# Patient Record
Sex: FEMALE | Race: WHITE | Employment: FULL TIME | URBAN - METROPOLITAN AREA
[De-identification: names, ages, dates, MRNs, and addresses within clinical notes are randomized per-mention and may not be internally consistent; named-entity substitution may affect disease eponyms.]

---

## 2017-06-14 DIAGNOSIS — F41.9 ANXIETY: ICD-10-CM

## 2017-06-14 RX ORDER — SERTRALINE HYDROCHLORIDE 100 MG/1
TABLET, FILM COATED ORAL
Qty: 90 TAB | Refills: 1 | Status: SHIPPED | OUTPATIENT
Start: 2017-06-14 | End: 2017-12-28 | Stop reason: SDUPTHER

## 2017-12-28 DIAGNOSIS — F41.9 ANXIETY: ICD-10-CM

## 2017-12-28 RX ORDER — SERTRALINE HYDROCHLORIDE 100 MG/1
TABLET, FILM COATED ORAL
Qty: 90 TAB | Refills: 0 | Status: SHIPPED | OUTPATIENT
Start: 2017-12-28 | End: 2018-03-30 | Stop reason: SDUPTHER

## 2017-12-28 NOTE — TELEPHONE ENCOUNTER
Called pt and informed her through voicemail and that she is now due for her f/u within the next 1-2 mos.

## 2017-12-28 NOTE — TELEPHONE ENCOUNTER
Please call patient. Last seen 11/17, due for f/u, needs to be seen in the next 1-2 months prior to next refill.

## 2018-03-30 ENCOUNTER — OFFICE VISIT (OUTPATIENT)
Dept: INTERNAL MEDICINE CLINIC | Age: 38
End: 2018-03-30

## 2018-03-30 VITALS
DIASTOLIC BLOOD PRESSURE: 64 MMHG | BODY MASS INDEX: 19.7 KG/M2 | OXYGEN SATURATION: 99 % | WEIGHT: 130 LBS | TEMPERATURE: 97.8 F | HEART RATE: 56 BPM | RESPIRATION RATE: 12 BRPM | HEIGHT: 68 IN | SYSTOLIC BLOOD PRESSURE: 102 MMHG

## 2018-03-30 DIAGNOSIS — Z13.220 SCREENING CHOLESTEROL LEVEL: ICD-10-CM

## 2018-03-30 DIAGNOSIS — F41.9 ANXIETY: ICD-10-CM

## 2018-03-30 DIAGNOSIS — Z13.1 DIABETES MELLITUS SCREENING: ICD-10-CM

## 2018-03-30 DIAGNOSIS — F41.9 ANXIETY: Primary | ICD-10-CM

## 2018-03-30 RX ORDER — SERTRALINE HYDROCHLORIDE 100 MG/1
TABLET, FILM COATED ORAL
Qty: 7 TAB | Refills: 0 | Status: SHIPPED | OUTPATIENT
Start: 2018-03-30 | End: 2018-03-30 | Stop reason: SDUPTHER

## 2018-03-30 RX ORDER — SERTRALINE HYDROCHLORIDE 50 MG/1
TABLET, FILM COATED ORAL
Qty: 135 TAB | Refills: 1 | Status: SHIPPED | OUTPATIENT
Start: 2018-03-30 | End: 2018-05-01 | Stop reason: SDUPTHER

## 2018-03-30 NOTE — PROGRESS NOTES
Shanna Loomis is a 40 y.o. female who was seen in clinic today (3/30/2018). Last seen in clinic on 11/4/16. Assessment & Plan:   Diagnoses and all orders for this visit:    1. Anxiety- stable, reviewed treatment options with her, reviewed life style changes to help improve mood, following changes were made today: will try to decrease from 100mg to 75mg and reassess. Reviewed expectations and getting her onto lowest dose of medications that work for her. -     sertraline (ZOLOFT) 50 mg tablet; TAKE 1.5 TABLET EVERY DAY    2. Diabetes mellitus screening- never had labs done previously, promises to get it done this time.  -     METABOLIC PANEL, COMPREHENSIVE    3. Screening cholesterol level- never had labs done previously, promises to get it done this time.  -     LIPID PANEL         Follow-up Disposition:  Return in about 6 months (around 9/30/2018) for Regular follow up. Subjective:   Sabrina Ribeiro was seen today for [address] Review  Patient is seen for anxiety. Since last visit: no major issues on a regular basis. Father did pass about 1 yr ago. She reports doing better then she would have expected. Ongoing anxiety symptoms include: nothing. She denies any: decreased sleep, feelings of apprehension/worry, poor concentration/attention and racing thoughts. Reports experiences the following side effects from the treatment: none. Prior to Admission medications    Medication Sig Start Date End Date Taking? Authorizing Provider   sertraline (ZOLOFT) 100 mg tablet TAKE 1 TABLET EVERY DAY 3/30/18  Yes Laisha Gallegos MD   levonorgestrel (MIRENA) 20 mcg/24 hour (5 years) IUD 1 Each by IntraUTERine route once. Yes Historical Provider          No Known Allergies        Review of Systems   Constitutional: Negative for malaise/fatigue and weight loss. Respiratory: Negative for cough and shortness of breath.     Cardiovascular: Negative for chest pain, palpitations and leg swelling. Gastrointestinal: Negative for abdominal pain, constipation, diarrhea, heartburn, nausea and vomiting. Genitourinary: Negative for frequency. Musculoskeletal: Negative for joint pain and myalgias. Skin: Negative for rash. Neurological: Negative for tingling, sensory change, focal weakness and headaches. Objective:   Physical Exam   Constitutional: No distress. Eyes: Conjunctivae are normal. No scleral icterus. Cardiovascular: Regular rhythm and normal heart sounds. Bradycardia present. No murmur heard. Pulmonary/Chest: Effort normal and breath sounds normal. She has no wheezes. She has no rales. Abdominal: Bowel sounds are normal. She exhibits no mass. There is no hepatosplenomegaly. There is no tenderness. Psychiatric: She has a normal mood and affect. Her behavior is normal.   Tearing up appropriately at times. Well groomed. Good eye contacts         Visit Vitals    /64    Pulse (!) 56    Temp 97.8 °F (36.6 °C) (Oral)    Resp 12    Ht 5' 8\" (1.727 m)    Wt 130 lb (59 kg)    SpO2 99%    BMI 19.77 kg/m2         Disclaimer:  Advised her to call back or return to office if symptoms worsen/change/persist.  Discussed expected course/resolution/complications of diagnosis in detail with patient. Medication risks/benefits/costs/interactions/alternatives discussed with patient. She was given an after visit summary which includes diagnoses, current medications, & vitals. She expressed understanding with the diagnosis and plan. Aspects of this note may have been generated using voice recognition software. Despite editing, there may be some syntax errors.        Shana Alegria MD

## 2018-03-30 NOTE — PATIENT INSTRUCTIONS
Anxiety Disorder: Care Instructions  Your Care Instructions    Anxiety is a normal reaction to stress. Difficult situations can cause you to have symptoms such as sweaty palms and a nervous feeling. In an anxiety disorder, the symptoms are far more severe. Constant worry, muscle tension, trouble sleeping, nausea and diarrhea, and other symptoms can make normal daily activities difficult or impossible. These symptoms may occur for no reason, and they can affect your work, school, or social life. Medicines, counseling, and self-care can all help. Follow-up care is a key part of your treatment and safety. Be sure to make and go to all appointments, and call your doctor if you are having problems. It's also a good idea to know your test results and keep a list of the medicines you take. How can you care for yourself at home? · Take medicines exactly as directed. Call your doctor if you think you are having a problem with your medicine. · Go to your counseling sessions and follow-up appointments. · Recognize and accept your anxiety. Then, when you are in a situation that makes you anxious, say to yourself, \"This is not an emergency. I feel uncomfortable, but I am not in danger. I can keep going even if I feel anxious. \"  · Be kind to your body:  ¨ Relieve tension with exercise or a massage. ¨ Get enough rest.  ¨ Avoid alcohol, caffeine, nicotine, and illegal drugs. They can increase your anxiety level and cause sleep problems. ¨ Learn and do relaxation techniques. See below for more about these techniques. · Engage your mind. Get out and do something you enjoy. Go to a funny movie, or take a walk or hike. Plan your day. Having too much or too little to do can make you anxious. · Keep a record of your symptoms. Discuss your fears with a good friend or family member, or join a support group for people with similar problems. Talking to others sometimes relieves stress.   · Get involved in social groups, or volunteer to help others. Being alone sometimes makes things seem worse than they are. · Get at least 30 minutes of exercise on most days of the week to relieve stress. Walking is a good choice. You also may want to do other activities, such as running, swimming, cycling, or playing tennis or team sports. Relaxation techniques  Do relaxation exercises 10 to 20 minutes a day. You can play soothing, relaxing music while you do them, if you wish. · Tell others in your house that you are going to do your relaxation exercises. Ask them not to disturb you. · Find a comfortable place, away from all distractions and noise. · Lie down on your back, or sit with your back straight. · Focus on your breathing. Make it slow and steady. · Breathe in through your nose. Breathe out through either your nose or mouth. · Breathe deeply, filling up the area between your navel and your rib cage. Breathe so that your belly goes up and down. · Do not hold your breath. · Breathe like this for 5 to 10 minutes. Notice the feeling of calmness throughout your whole body. As you continue to breathe slowly and deeply, relax by doing the following for another 5 to 10 minutes:  · Tighten and relax each muscle group in your body. You can begin at your toes and work your way up to your head. · Imagine your muscle groups relaxing and becoming heavy. · Empty your mind of all thoughts. · Let yourself relax more and more deeply. · Become aware of the state of calmness that surrounds you. · When your relaxation time is over, you can bring yourself back to alertness by moving your fingers and toes and then your hands and feet and then stretching and moving your entire body. Sometimes people fall asleep during relaxation, but they usually wake up shortly afterward. · Always give yourself time to return to full alertness before you drive a car or do anything that might cause an accident if you are not fully alert.  Never play a relaxation tape while you drive a car. When should you call for help? Call 911 anytime you think you may need emergency care. For example, call if:  ? · You feel you cannot stop from hurting yourself or someone else. ? Keep the numbers for these national suicide hotlines: 2-800-014-TALK (9-321.186.7330) and 0-650-KGZPLPV (8-788.998.4450). If you or someone you know talks about suicide or feeling hopeless, get help right away. ? Watch closely for changes in your health, and be sure to contact your doctor if:  ? · You have anxiety or fear that affects your life. ? · You have symptoms of anxiety that are new or different from those you had before. Where can you learn more? Go to http://kameron-enrico.info/. Enter P754 in the search box to learn more about \"Anxiety Disorder: Care Instructions. \"  Current as of: May 12, 2017  Content Version: 11.4  © 0593-2777 [employer]. Care instructions adapted under license by [employer] (which disclaims liability or warranty for this information). If you have questions about a medical condition or this instruction, always ask your healthcare professional. [address] any warranty or liability for your use of this information.

## 2018-05-01 DIAGNOSIS — F41.9 ANXIETY: ICD-10-CM

## 2018-05-01 RX ORDER — SERTRALINE HYDROCHLORIDE 100 MG/1
TABLET, FILM COATED ORAL
Qty: 90 TAB | Refills: 1 | Status: SHIPPED | OUTPATIENT
Start: 2018-05-01 | End: 2018-08-08 | Stop reason: SDUPTHER

## 2018-08-04 ENCOUNTER — APPOINTMENT (OUTPATIENT)
Dept: GENERAL RADIOLOGY | Age: 38
End: 2018-08-04
Attending: PHYSICIAN ASSISTANT
Payer: COMMERCIAL

## 2018-08-04 ENCOUNTER — HOSPITAL ENCOUNTER (EMERGENCY)
Age: 38
Discharge: HOME | End: 2018-08-04
Attending: EMERGENCY MEDICINE | Admitting: EMERGENCY MEDICINE
Payer: COMMERCIAL

## 2018-08-04 VITALS
HEIGHT: 68 IN | RESPIRATION RATE: 18 BRPM | HEART RATE: 64 BPM | SYSTOLIC BLOOD PRESSURE: 132 MMHG | WEIGHT: 130 LBS | TEMPERATURE: 97.8 F | DIASTOLIC BLOOD PRESSURE: 82 MMHG | BODY MASS INDEX: 19.7 KG/M2 | OXYGEN SATURATION: 100 %
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
## 2018-08-04 DIAGNOSIS — S42.022A CLOSED DISPLACED FRACTURE OF SHAFT OF LEFT CLAVICLE, INITIAL ENCOUNTER: ICD-10-CM

## 2018-08-04 DIAGNOSIS — T07.XXXA MULTIPLE ABRASIONS: ICD-10-CM

## 2018-08-04 PROCEDURE — L3650 SO 8 ABD RESTRAINT PRE OTS: HCPCS

## 2018-08-04 PROCEDURE — 74011250637 HC RX REV CODE- 250/637: Performed by: PHYSICIAN ASSISTANT

## 2018-08-04 PROCEDURE — 99283 EMERGENCY DEPT VISIT LOW MDM: CPT

## 2018-08-04 PROCEDURE — 90471 IMMUNIZATION ADMIN: CPT

## 2018-08-04 PROCEDURE — 74011250636 HC RX REV CODE- 250/636: Performed by: EMERGENCY MEDICINE

## 2018-08-04 PROCEDURE — 73000 X-RAY EXAM OF COLLAR BONE: CPT

## 2018-08-04 PROCEDURE — 90715 TDAP VACCINE 7 YRS/> IM: CPT | Performed by: EMERGENCY MEDICINE

## 2018-08-04 PROCEDURE — A4565 SLINGS: HCPCS

## 2018-08-04 PROCEDURE — 71045 X-RAY EXAM CHEST 1 VIEW: CPT

## 2018-08-04 PROCEDURE — 74011250637 HC RX REV CODE- 250/637: Performed by: EMERGENCY MEDICINE

## 2018-08-04 RX ORDER — IBUPROFEN 600 MG/1
600 TABLET ORAL
Qty: 20 TAB | Refills: 0 | Status: SHIPPED | OUTPATIENT
Start: 2018-08-04 | End: 2018-08-27

## 2018-08-04 RX ORDER — HYDROCODONE BITARTRATE AND ACETAMINOPHEN 7.5; 325 MG/1; MG/1
1 TABLET ORAL
Qty: 20 TAB | Refills: 0 | Status: SHIPPED | OUTPATIENT
Start: 2018-08-04 | End: 2018-08-27

## 2018-08-04 RX ORDER — ACETAMINOPHEN 325 MG/1
650 TABLET ORAL
Status: COMPLETED | OUTPATIENT
Start: 2018-08-04 | End: 2018-08-04

## 2018-08-04 RX ORDER — ONDANSETRON 4 MG/1
4 TABLET, ORALLY DISINTEGRATING ORAL
Status: COMPLETED | OUTPATIENT
Start: 2018-08-04 | End: 2018-08-04

## 2018-08-04 RX ORDER — METHOCARBAMOL 500 MG/1
750 TABLET, FILM COATED ORAL
Status: COMPLETED | OUTPATIENT
Start: 2018-08-04 | End: 2018-08-04

## 2018-08-04 RX ORDER — METHOCARBAMOL 750 MG/1
750 TABLET, FILM COATED ORAL 3 TIMES DAILY
Qty: 20 TAB | Refills: 0 | Status: SHIPPED | OUTPATIENT
Start: 2018-08-04 | End: 2018-08-27

## 2018-08-04 RX ORDER — OXYCODONE HYDROCHLORIDE 5 MG/1
5 TABLET ORAL
Status: COMPLETED | OUTPATIENT
Start: 2018-08-04 | End: 2018-08-04

## 2018-08-04 RX ADMIN — METHOCARBAMOL 750 MG: 500 TABLET ORAL at 15:19

## 2018-08-04 RX ADMIN — OXYCODONE HYDROCHLORIDE 5 MG: 5 TABLET ORAL at 15:19

## 2018-08-04 RX ADMIN — ACETAMINOPHEN 650 MG: 325 TABLET, FILM COATED ORAL at 14:45

## 2018-08-04 RX ADMIN — TETANUS TOXOID, REDUCED DIPHTHERIA TOXOID AND ACELLULAR PERTUSSIS VACCINE, ADSORBED 0.5 ML: 5; 2.5; 8; 8; 2.5 SUSPENSION INTRAMUSCULAR at 15:20

## 2018-08-04 RX ADMIN — ONDANSETRON 4 MG: 4 TABLET, ORALLY DISINTEGRATING ORAL at 15:19

## 2018-08-04 NOTE — DISCHARGE INSTRUCTIONS
Broken Collarbone: Care Instructions  Your Care Instructions    You have broken or cracked your collarbone, or clavicle. The collarbone is the long, slightly curved bone that connects the shoulder to the chest. It supports the shoulder. A broken collarbone may take 6 weeks or longer to heal. You will need to wear an arm sling to keep the broken bone from moving while it heals. At first, it may hurt to move your arm. This will get better with time. You heal best when you take good care of yourself. Eat a variety of healthy foods, and don't smoke. Follow-up care is a key part of your treatment and safety. Be sure to make and go to all appointments, and call your doctor if you are having problems. It's also a good idea to know your test results and keep a list of the medicines you take. How can you care for yourself at home? · Wear the sling day and night for as long as your doctor tells you to. You may take off the sling when you bathe. When the sling is off, avoid arm positions or motions that cause or increase pain. · Put ice or a cold pack on your collarbone for 10 to 20 minutes at a time. Try to do this every 1 to 2 hours for the next 3 days (when you are awake) or until the swelling goes down. Put a thin cloth between the ice and your skin. · Be safe with medicines. Take pain medicines exactly as directed. ¨ If the doctor gave you a prescription medicine for pain, take it as prescribed. ¨ If you are not taking a prescription pain medicine, ask your doctor if you can take an over-the-counter medicine. ¨ Do not take two or more pain medicines at the same time unless the doctor told you to. Many pain medicines have acetaminophen, which is Tylenol. Too much acetaminophen (Tylenol) can be harmful. · Try sleeping with pillows propped under your arm for comfort. · After a few days, put your fingers, wrist, and elbow through their full range of motion several times a day.  This will keep them from getting stiff. You may get instructions on rehabilitation exercises you can do when your shoulder starts to heal.  · You may use warm packs after the first 3 days for 15 to 20 minutes at a time to ease pain. · You may notice a bump where the collarbone is broken. Over time, the bump will get smaller. A small bump may remain, but it should not affect your arm's strength or movement. When should you call for help? Call your doctor now or seek immediate medical care if:    · Your fingers become numb, tingly, cool, or pale.     · You cannot move your arm.    Watch closely for changes in your health, and be sure to contact your doctor if:    · You have new or increased pain.     · You have new or increased swelling.     · You do not get better as expected. Where can you learn more? Go to http://kameron-enrico.info/. Enter P186 in the search box to learn more about \"Broken Collarbone: Care Instructions. \"  Current as of: November 29, 2017  Content Version: 11.7  © 1939-9457 [employer]. Care instructions adapted under license by [employer] (which disclaims liability or warranty for this information). If you have questions about a medical condition or this instruction, always ask your healthcare professional. [place] [address] any warranty or liability for your use of this information. Scrapes (Abrasions): Care Instructions  Your Care Instructions  Scrapes (abrasions) are wounds where your skin has been rubbed or torn off. Most scrapes do not go deep into the skin, but some may remove several layers of skin. Scrapes usually don't bleed much, but they may ooze pinkish fluid. Scrapes on the head or face may appear worse than they are. They may bleed a lot because of the good blood supply to this area. Most scrapes heal well and may not need a bandage. They usually heal within 3 to 7 days.  A large, deep scrape may take 1 to 2 weeks or longer to heal. A scab may form on some scrapes. Follow-up care is a key part of your treatment and safety. Be sure to make and go to all appointments, and call your doctor if you are having problems. It's also a good idea to know your test results and keep a list of the medicines you take. How can you care for yourself at home? · If your doctor told you how to care for your wound, follow your doctor's instructions. If you did not get instructions, follow this general advice:  ¨ Wash the scrape with clean water 2 times a day. Don't use hydrogen peroxide or alcohol, which can slow healing. ¨ You may cover the scrape with a thin layer of petroleum jelly, such as Vaseline, and a nonstick bandage. ¨ Apply more petroleum jelly and replace the bandage as needed. · Prop up the injured area on a pillow anytime you sit or lie down during the next 3 days. Try to keep it above the level of your heart. This will help reduce swelling. · Be safe with medicines. Take pain medicines exactly as directed. ¨ If the doctor gave you a prescription medicine for pain, take it as prescribed. ¨ If you are not taking a prescription pain medicine, ask your doctor if you can take an over-the-counter medicine. When should you call for help? Call your doctor now or seek immediate medical care if:    · You have signs of infection, such as:  ¨ Increased pain, swelling, warmth, or redness around the scrape. ¨ Red streaks leading from the scrape. ¨ Pus draining from the scrape. ¨ A fever.     · The scrape starts to bleed, and blood soaks through the bandage. Oozing small amounts of blood is normal.    Watch closely for changes in your health, and be sure to contact your doctor if the scrape is not getting better each day. Where can you learn more? Go to http://kameron-enrico.info/. Enter A374 in the search box to learn more about \"Scrapes (Abrasions): Care Instructions. \"  Current as of: November 20, 2017  Content Version: 11.7  © 0125-7444 [employer]. Care instructions adapted under license by [employer] (which disclaims liability or warranty for this information). If you have questions about a medical condition or this instruction, always ask your healthcare professional. [address] any warranty or liability for your use of this information.

## 2018-08-04 NOTE — ED NOTES
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
## 2018-08-04 NOTE — ED NOTES
Patient verbalizes understanding of discharge instructions. Opportunity for questions provided. Patient in no apparent distress. Patient ambulatory upon discharge. Visit Vitals  /82 (BP 1 Location: Right arm, BP Patient Position: Sitting)  Pulse 64  Temp 97.8 °F (36.6 °C)  Resp 18  Ht 5' 8\" (1.727 m)  Wt 59 kg (130 lb)  SpO2 100%  BMI 19.77 kg/m2 Patient requested copy of xray. Tech will bring it to ER.
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
## 2018-08-08 ENCOUNTER — ANESTHESIA EVENT (OUTPATIENT)
Dept: MEDSURG UNIT | Age: 38
End: 2018-08-08
Payer: COMMERCIAL

## 2018-08-08 DIAGNOSIS — F41.9 ANXIETY: ICD-10-CM

## 2018-08-08 RX ORDER — SERTRALINE HYDROCHLORIDE 100 MG/1
TABLET, FILM COATED ORAL
Qty: 90 TAB | Refills: 0 | Status: SHIPPED | OUTPATIENT
Start: 2018-08-08 | End: 2018-08-10 | Stop reason: SDUPTHER
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
## 2018-08-09 ENCOUNTER — HOSPITAL ENCOUNTER (OUTPATIENT)
Age: 38
Setting detail: OUTPATIENT SURGERY
Discharge: HOME | End: 2018-08-09
Attending: ORTHOPAEDIC SURGERY | Admitting: ORTHOPAEDIC SURGERY
Payer: COMMERCIAL

## 2018-08-09 ENCOUNTER — ANESTHESIA (OUTPATIENT)
Dept: MEDSURG UNIT | Age: 38
End: 2018-08-09
Payer: COMMERCIAL

## 2018-08-09 VITALS
DIASTOLIC BLOOD PRESSURE: 61 MMHG | BODY MASS INDEX: 19.7 KG/M2 | HEART RATE: 58 BPM | SYSTOLIC BLOOD PRESSURE: 108 MMHG | TEMPERATURE: 98.1 F | HEIGHT: 68 IN | WEIGHT: 130 LBS | OXYGEN SATURATION: 98 % | RESPIRATION RATE: 13 BRPM

## 2018-08-09 LAB — HCG UR QL: NEGATIVE

## 2018-08-09 PROCEDURE — 74011250636 HC RX REV CODE- 250/636: Performed by: ANESTHESIOLOGY

## 2018-08-09 PROCEDURE — 76030000004 HC AMB SURG OR TIME 2 TO 2.5: Performed by: ORTHOPAEDIC SURGERY

## 2018-08-09 PROCEDURE — 77030011640 HC PAD GRND REM COVD -A: Performed by: ORTHOPAEDIC SURGERY

## 2018-08-09 PROCEDURE — 74011250636 HC RX REV CODE- 250/636

## 2018-08-09 PROCEDURE — 77030003737 HC BIT DRL ACMD -C: Performed by: ORTHOPAEDIC SURGERY

## 2018-08-09 PROCEDURE — 76210000036 HC AMBSU PH I REC 1.5 TO 2 HR: Performed by: ORTHOPAEDIC SURGERY

## 2018-08-09 PROCEDURE — 77030003601 HC NDL NRV BLK BBMI -A

## 2018-08-09 PROCEDURE — C1713 ANCHOR/SCREW BN/BN,TIS/BN: HCPCS | Performed by: ORTHOPAEDIC SURGERY

## 2018-08-09 PROCEDURE — 77030032490 HC SLV COMPR SCD KNE COVD -B: Performed by: ORTHOPAEDIC SURGERY

## 2018-08-09 PROCEDURE — 77030031139 HC SUT VCRL2 J&J -A: Performed by: ORTHOPAEDIC SURGERY

## 2018-08-09 PROCEDURE — 74011000250 HC RX REV CODE- 250

## 2018-08-09 PROCEDURE — 76060000064 HC AMB SURG ANES 2 TO 2.5 HR: Performed by: ORTHOPAEDIC SURGERY

## 2018-08-09 PROCEDURE — 74011250636 HC RX REV CODE- 250/636: Performed by: PHYSICIAN ASSISTANT

## 2018-08-09 PROCEDURE — 77030018836 HC SOL IRR NACL ICUM -A: Performed by: ORTHOPAEDIC SURGERY

## 2018-08-09 PROCEDURE — 77030020782 HC GWN BAIR PAWS FLX 3M -B

## 2018-08-09 PROCEDURE — 77030026438 HC STYL ET INTUB CARD -A: Performed by: ANESTHESIOLOGY

## 2018-08-09 PROCEDURE — A4565 SLINGS: HCPCS

## 2018-08-09 PROCEDURE — 77030018673: Performed by: ORTHOPAEDIC SURGERY

## 2018-08-09 PROCEDURE — 81025 URINE PREGNANCY TEST: CPT

## 2018-08-09 PROCEDURE — 77030008684 HC TU ET CUF COVD -B: Performed by: ANESTHESIOLOGY

## 2018-08-09 RX ORDER — SODIUM CHLORIDE 0.9 % (FLUSH) 0.9 %
5-10 SYRINGE (ML) INJECTION AS NEEDED
Status: DISCONTINUED | OUTPATIENT
Start: 2018-08-09 | End: 2018-08-09 | Stop reason: HOSPADM

## 2018-08-09 RX ORDER — DEXMEDETOMIDINE HYDROCHLORIDE 4 UG/ML
INJECTION, SOLUTION INTRAVENOUS AS NEEDED
Status: DISCONTINUED | OUTPATIENT
Start: 2018-08-09 | End: 2018-08-09 | Stop reason: HOSPADM

## 2018-08-09 RX ORDER — ONDANSETRON 2 MG/ML
4 INJECTION INTRAMUSCULAR; INTRAVENOUS AS NEEDED
Status: DISCONTINUED | OUTPATIENT
Start: 2018-08-09 | End: 2018-08-09 | Stop reason: HOSPADM

## 2018-08-09 RX ORDER — LIDOCAINE HYDROCHLORIDE 10 MG/ML
0.1 INJECTION, SOLUTION EPIDURAL; INFILTRATION; INTRACAUDAL; PERINEURAL AS NEEDED
Status: DISCONTINUED | OUTPATIENT
Start: 2018-08-09 | End: 2018-08-09 | Stop reason: HOSPADM

## 2018-08-09 RX ORDER — MIDAZOLAM HYDROCHLORIDE 1 MG/ML
0.5 INJECTION, SOLUTION INTRAMUSCULAR; INTRAVENOUS
Status: DISCONTINUED | OUTPATIENT
Start: 2018-08-09 | End: 2018-08-09 | Stop reason: HOSPADM

## 2018-08-09 RX ORDER — FENTANYL CITRATE 50 UG/ML
25 INJECTION, SOLUTION INTRAMUSCULAR; INTRAVENOUS
Status: DISCONTINUED | OUTPATIENT
Start: 2018-08-09 | End: 2018-08-09 | Stop reason: HOSPADM

## 2018-08-09 RX ORDER — SODIUM CHLORIDE 0.9 % (FLUSH) 0.9 %
5-10 SYRINGE (ML) INJECTION EVERY 8 HOURS
Status: DISCONTINUED | OUTPATIENT
Start: 2018-08-09 | End: 2018-08-09 | Stop reason: HOSPADM

## 2018-08-09 RX ORDER — FENTANYL CITRATE 50 UG/ML
INJECTION, SOLUTION INTRAMUSCULAR; INTRAVENOUS AS NEEDED
Status: DISCONTINUED | OUTPATIENT
Start: 2018-08-09 | End: 2018-08-09 | Stop reason: HOSPADM

## 2018-08-09 RX ORDER — SODIUM CHLORIDE, SODIUM LACTATE, POTASSIUM CHLORIDE, CALCIUM CHLORIDE 600; 310; 30; 20 MG/100ML; MG/100ML; MG/100ML; MG/100ML
125 INJECTION, SOLUTION INTRAVENOUS CONTINUOUS
Status: DISCONTINUED | OUTPATIENT
Start: 2018-08-09 | End: 2018-08-09 | Stop reason: HOSPADM

## 2018-08-09 RX ORDER — DIPHENHYDRAMINE HYDROCHLORIDE 50 MG/ML
12.5 INJECTION, SOLUTION INTRAMUSCULAR; INTRAVENOUS AS NEEDED
Status: DISCONTINUED | OUTPATIENT
Start: 2018-08-09 | End: 2018-08-09 | Stop reason: HOSPADM

## 2018-08-09 RX ORDER — PHENYLEPHRINE HCL IN 0.9% NACL 0.4MG/10ML
SYRINGE (ML) INTRAVENOUS AS NEEDED
Status: DISCONTINUED | OUTPATIENT
Start: 2018-08-09 | End: 2018-08-09 | Stop reason: HOSPADM

## 2018-08-09 RX ORDER — MIDAZOLAM HYDROCHLORIDE 1 MG/ML
1 INJECTION, SOLUTION INTRAMUSCULAR; INTRAVENOUS AS NEEDED
Status: DISCONTINUED | OUTPATIENT
Start: 2018-08-09 | End: 2018-08-09 | Stop reason: HOSPADM

## 2018-08-09 RX ORDER — OXYCODONE AND ACETAMINOPHEN 5; 325 MG/1; MG/1
1 TABLET ORAL AS NEEDED
Status: DISCONTINUED | OUTPATIENT
Start: 2018-08-09 | End: 2018-08-09 | Stop reason: HOSPADM

## 2018-08-09 RX ORDER — ROPIVACAINE HYDROCHLORIDE 5 MG/ML
100 INJECTION, SOLUTION EPIDURAL; INFILTRATION; PERINEURAL
Status: COMPLETED | OUTPATIENT
Start: 2018-08-09 | End: 2018-08-09

## 2018-08-09 RX ORDER — SODIUM CHLORIDE 9 MG/ML
25 INJECTION, SOLUTION INTRAVENOUS CONTINUOUS
Status: DISCONTINUED | OUTPATIENT
Start: 2018-08-09 | End: 2018-08-09 | Stop reason: HOSPADM

## 2018-08-09 RX ORDER — MORPHINE SULFATE 10 MG/ML
2 INJECTION, SOLUTION INTRAMUSCULAR; INTRAVENOUS
Status: DISCONTINUED | OUTPATIENT
Start: 2018-08-09 | End: 2018-08-09 | Stop reason: HOSPADM

## 2018-08-09 RX ORDER — ONDANSETRON 2 MG/ML
INJECTION INTRAMUSCULAR; INTRAVENOUS AS NEEDED
Status: DISCONTINUED | OUTPATIENT
Start: 2018-08-09 | End: 2018-08-09 | Stop reason: HOSPADM

## 2018-08-09 RX ORDER — GLYCOPYRROLATE 0.2 MG/ML
INJECTION INTRAMUSCULAR; INTRAVENOUS AS NEEDED
Status: DISCONTINUED | OUTPATIENT
Start: 2018-08-09 | End: 2018-08-09 | Stop reason: HOSPADM

## 2018-08-09 RX ORDER — NEOSTIGMINE METHYLSULFATE 1 MG/ML
INJECTION INTRAVENOUS AS NEEDED
Status: DISCONTINUED | OUTPATIENT
Start: 2018-08-09 | End: 2018-08-09 | Stop reason: HOSPADM

## 2018-08-09 RX ORDER — ROCURONIUM BROMIDE 10 MG/ML
INJECTION, SOLUTION INTRAVENOUS AS NEEDED
Status: DISCONTINUED | OUTPATIENT
Start: 2018-08-09 | End: 2018-08-09 | Stop reason: HOSPADM

## 2018-08-09 RX ORDER — FENTANYL CITRATE 50 UG/ML
50 INJECTION, SOLUTION INTRAMUSCULAR; INTRAVENOUS AS NEEDED
Status: DISCONTINUED | OUTPATIENT
Start: 2018-08-09 | End: 2018-08-09 | Stop reason: HOSPADM

## 2018-08-09 RX ORDER — DEXAMETHASONE SODIUM PHOSPHATE 4 MG/ML
INJECTION, SOLUTION INTRA-ARTICULAR; INTRALESIONAL; INTRAMUSCULAR; INTRAVENOUS; SOFT TISSUE AS NEEDED
Status: DISCONTINUED | OUTPATIENT
Start: 2018-08-09 | End: 2018-08-09 | Stop reason: HOSPADM

## 2018-08-09 RX ORDER — CEFAZOLIN SODIUM 2 G/50ML
2 SOLUTION INTRAVENOUS ONCE
Status: COMPLETED | OUTPATIENT
Start: 2018-08-09 | End: 2018-08-09

## 2018-08-09 RX ORDER — PROPOFOL 10 MG/ML
INJECTION, EMULSION INTRAVENOUS AS NEEDED
Status: DISCONTINUED | OUTPATIENT
Start: 2018-08-09 | End: 2018-08-09 | Stop reason: HOSPADM

## 2018-08-09 RX ORDER — EPHEDRINE SULFATE 50 MG/ML
INJECTION, SOLUTION INTRAVENOUS AS NEEDED
Status: DISCONTINUED | OUTPATIENT
Start: 2018-08-09 | End: 2018-08-09 | Stop reason: HOSPADM

## 2018-08-09 RX ORDER — LIDOCAINE HYDROCHLORIDE 20 MG/ML
INJECTION, SOLUTION EPIDURAL; INFILTRATION; INTRACAUDAL; PERINEURAL AS NEEDED
Status: DISCONTINUED | OUTPATIENT
Start: 2018-08-09 | End: 2018-08-09 | Stop reason: HOSPADM

## 2018-08-09 RX ADMIN — ROCURONIUM BROMIDE 5 MG: 10 INJECTION, SOLUTION INTRAVENOUS at 07:45

## 2018-08-09 RX ADMIN — DEXMEDETOMIDINE HYDROCHLORIDE 4 MCG: 4 INJECTION, SOLUTION INTRAVENOUS at 09:02

## 2018-08-09 RX ADMIN — ROCURONIUM BROMIDE 25 MG: 10 INJECTION, SOLUTION INTRAVENOUS at 07:46

## 2018-08-09 RX ADMIN — SODIUM CHLORIDE, SODIUM LACTATE, POTASSIUM CHLORIDE, AND CALCIUM CHLORIDE 125 ML/HR: 600; 310; 30; 20 INJECTION, SOLUTION INTRAVENOUS at 06:55

## 2018-08-09 RX ADMIN — FENTANYL CITRATE 25 MCG: 50 INJECTION, SOLUTION INTRAMUSCULAR; INTRAVENOUS at 10:33

## 2018-08-09 RX ADMIN — PROPOFOL 50 MG: 10 INJECTION, EMULSION INTRAVENOUS at 07:46

## 2018-08-09 RX ADMIN — ROPIVACAINE HYDROCHLORIDE 100 MG: 5 INJECTION, SOLUTION EPIDURAL; INFILTRATION; PERINEURAL at 07:36

## 2018-08-09 RX ADMIN — DEXMEDETOMIDINE HYDROCHLORIDE 4 MCG: 4 INJECTION, SOLUTION INTRAVENOUS at 09:34

## 2018-08-09 RX ADMIN — CEFAZOLIN SODIUM 2 G: 2 SOLUTION INTRAVENOUS at 08:10

## 2018-08-09 RX ADMIN — DEXAMETHASONE SODIUM PHOSPHATE 8 MG: 4 INJECTION, SOLUTION INTRA-ARTICULAR; INTRALESIONAL; INTRAMUSCULAR; INTRAVENOUS; SOFT TISSUE at 08:26

## 2018-08-09 RX ADMIN — ONDANSETRON 4 MG: 2 INJECTION INTRAMUSCULAR; INTRAVENOUS at 08:36

## 2018-08-09 RX ADMIN — EPHEDRINE SULFATE 5 MG: 50 INJECTION, SOLUTION INTRAVENOUS at 08:08

## 2018-08-09 RX ADMIN — LIDOCAINE HYDROCHLORIDE 50 MG: 20 INJECTION, SOLUTION EPIDURAL; INFILTRATION; INTRACAUDAL; PERINEURAL at 09:03

## 2018-08-09 RX ADMIN — LIDOCAINE HYDROCHLORIDE 50 MG: 20 INJECTION, SOLUTION EPIDURAL; INFILTRATION; INTRACAUDAL; PERINEURAL at 07:45

## 2018-08-09 RX ADMIN — MIDAZOLAM HYDROCHLORIDE 4 MG: 1 INJECTION, SOLUTION INTRAMUSCULAR; INTRAVENOUS at 07:32

## 2018-08-09 RX ADMIN — GLYCOPYRROLATE 0.2 MG: 0.2 INJECTION INTRAMUSCULAR; INTRAVENOUS at 09:24

## 2018-08-09 RX ADMIN — PROPOFOL 100 MG: 10 INJECTION, EMULSION INTRAVENOUS at 07:45

## 2018-08-09 RX ADMIN — FENTANYL CITRATE 50 MCG: 50 INJECTION, SOLUTION INTRAMUSCULAR; INTRAVENOUS at 08:28

## 2018-08-09 RX ADMIN — ONDANSETRON 4 MG: 2 INJECTION INTRAMUSCULAR; INTRAVENOUS at 09:24

## 2018-08-09 RX ADMIN — DEXMEDETOMIDINE HYDROCHLORIDE 8 MCG: 4 INJECTION, SOLUTION INTRAVENOUS at 08:13

## 2018-08-09 RX ADMIN — NEOSTIGMINE METHYLSULFATE 2 MG: 1 INJECTION INTRAVENOUS at 09:27

## 2018-08-09 RX ADMIN — FENTANYL CITRATE 50 MCG: 50 INJECTION, SOLUTION INTRAMUSCULAR; INTRAVENOUS at 07:32

## 2018-08-09 RX ADMIN — DEXMEDETOMIDINE HYDROCHLORIDE 4 MCG: 4 INJECTION, SOLUTION INTRAVENOUS at 09:30

## 2018-08-09 RX ADMIN — SODIUM CHLORIDE, SODIUM LACTATE, POTASSIUM CHLORIDE, AND CALCIUM CHLORIDE: 600; 310; 30; 20 INJECTION, SOLUTION INTRAVENOUS at 09:40

## 2018-08-09 RX ADMIN — Medication 40 MCG: at 08:05

## 2018-08-09 RX ADMIN — ROCURONIUM BROMIDE 20 MG: 10 INJECTION, SOLUTION INTRAVENOUS at 08:13

## 2018-08-09 NOTE — DISCHARGE INSTRUCTIONS
POST OPERATIVE INSTRUCTIONS  Left clavicle Fracture  Aissatou Bucio MD III        1. First meal at home should be clear liquids. Progress to regular diet as tolerated. 2. Apply an ice bag for 20-30 minutes 4 times a day for the first 2-3 days to reduce swelling and pain and then use as desired. 3.  it is okay to remove the dressing on postop day 2. It is ok to shower but keep the  Wound clean and dry. If the wound gets them simply padded dry. Do not allow direct saturation of the incision. 4.   Wear sling when ambulating. It can be off for short periods when sitting but not when up ambulating or sleeping. .      5. Exercises- Move your fingers and elbow as tolerated. 6. Let pain be your guide, too much pain, too much activity      7. Medication Instructions are listed below:     continue taking Percocet for pain as needed. 8. If you develop a fever greater than 101, unexpected redness, or swelling, in your leg, please call the office immediately. Some bleeding and or drainage can be expected the first few days after surgery. 9. A post- op appointment has already been made for you  on August 20th. Please call the office if you need change the date or time of your appointment. 10. Thank you for following the above instructions. If you have any questions, please call the office and the  will put you in touch with one of my assistants. DISCHARGE SUMMARY from Nurse    PATIENT INSTRUCTIONS:    After general anesthesia or intravenous sedation, for 24 hours or while taking prescription Narcotics:  · Limit your activities  · Do not drive and operate hazardous machinery  · Do not make important personal or business decisions  · Do  not drink alcoholic beverages  · If you have not urinated within 8 hours after discharge, please contact your surgeon on call.     Report the following to your surgeon:  · Excessive pain, swelling, redness or odor of or around the surgical area  · Temperature over 100.5  · Nausea and vomiting lasting longer than 4 hours or if unable to take medications  · Any signs of decreased circulation or nerve impairment to extremity: change in color, persistent  numbness, tingling, coldness or increase pain  · Any questions    What to do at Home:  Recommended activity: See surgical instructions. If you experience any of the following symptoms as noted above, please follow up with Dr. Santa Conrad. *  Please give a list of your current medications to your Primary Care Provider. *  Please update this list whenever your medications are discontinued, doses are      changed, or new medications (including over-the-counter products) are added. *  Please carry medication information at all times in case of emergency situations. These are general instructions for a healthy lifestyle:    No smoking/ No tobacco products/ Avoid exposure to second hand smoke  Surgeon General's Warning:  Quitting smoking now greatly reduces serious risk to your health. Obesity, smoking, and sedentary lifestyle greatly increases your risk for illness    A healthy diet, regular physical exercise & weight monitoring are important for maintaining a healthy lifestyle    You may be retaining fluid if you have a history of heart failure or if you experience any of the following symptoms:  Weight gain of 3 pounds or more overnight or 5 pounds in a week, increased swelling in our hands or feet or shortness of breath while lying flat in bed. Please call your doctor as soon as you notice any of these symptoms; do not wait until your next office visit. Recognize signs and symptoms of STROKE:    F-face looks uneven    A-arms unable to move or move unevenly    S-speech slurred or non-existent    T-time-call 911 as soon as signs and symptoms begin-DO NOT go       Back to bed or wait to see if you get better-TIME IS BRAIN.     Warning Signs of HEART ATTACK     Call 911 if you have these symptoms:   Chest discomfort. Most heart attacks involve discomfort in the center of the chest that lasts more than a few minutes, or that goes away and comes back. It can feel like uncomfortable pressure, squeezing, fullness, or pain.  Discomfort in other areas of the upper body. Symptoms can include pain or discomfort in one or both arms, the back, neck, jaw, or stomach.  Shortness of breath with or without chest discomfort.  Other signs may include breaking out in a cold sweat, nausea, or lightheadedness. Don't wait more than five minutes to call 911 - MINUTES MATTER! Fast action can save your life. Calling 911 is almost always the fastest way to get lifesaving treatment. Emergency Medical Services staff can begin treatment when they arrive -- up to an hour sooner than if someone gets to the hospital by car. The discharge information has been reviewed with the parent. The parent verbalized understanding. Discharge medications reviewed with the parent and appropriate educational materials and side effects teaching were provided.   ___________________________________________________________________________________________________________________________________

## 2018-08-09 NOTE — ANESTHESIA PREPROCEDURE EVALUATION
Anesthetic History   No history of anesthetic complications            Review of Systems / Medical History  Patient summary reviewed, nursing notes reviewed and pertinent labs reviewed    Pulmonary  Within defined limits                 Neuro/Psych   Within defined limits           Cardiovascular  Within defined limits                Exercise tolerance: >4 METS     GI/Hepatic/Renal  Within defined limits              Endo/Other  Within defined limits        Pertinent negatives: No diabetes   Other Findings   Comments: L clav fx           Physical Exam    Airway  Mallampati: II  TM Distance: > 6 cm  Neck ROM: normal range of motion   Mouth opening: Normal     Cardiovascular  Regular rate and rhythm,  S1 and S2 normal,  no murmur, click, rub, or gallop             Dental  No notable dental hx       Pulmonary  Breath sounds clear to auscultation               Abdominal  GI exam deferred       Other Findings            Anesthetic Plan    ASA: 2  Anesthesia type: general          Induction: Intravenous  Anesthetic plan and risks discussed with: Patient

## 2018-08-09 NOTE — OP NOTES
Operative Note       Patient: Flako Alatorre MRN: [id number]  SSN: [id number]    YOB: 1980  Age: 41 y.o. Sex: female      DATE OF SURGERY:  8/9/2018    PREOPERATIVE DIAGNOSIS: LEFT CLAVICLE FRACTURE. POSTOPERATIVE DIAGNOSIS:   Left clavicle fracture    SURGICAL PROCEDURE:    Open reduction internal fixation of left clavicle fracture    SURGEON:  Aissatou Varghese M.D. ANESTHESIA:  General with block. EBL:   25 cc. COMPLICATIONS: None    DRAINS:  None. INDICATIONS FOR PROCEDURE:  Flako Alatorre is a 41 y.o. female who has a displaced clavicle fracture need of repair. PROCEDURE:  After the procedure was explained to the patient, including risks, benefits, and possible complications, the patient signed the informed consent. The patient was then taken to the operating room and placed on the operating room table in a supine position. After satisfactory anesthesia was gained through sedation and scalene block, the patient was positioned appropriate the table with a shoulder roll between the shoulder blades for clavicle ORIF. After this the patients left clavicle an arm arm was prepped and draped in usual sterile fashion. Along to incision was made directly above the clavicle and dissection was done down to the musculature. This was divided over top of the clavicle to expose the fracture site. Stripping was done around the fracture site to expose its and never carefully cleaning curettage. Fracture is then reduced and a K-wire was placed to hold the reduction. A 6 hole plate was then taken and placed on the dorsal surface of the clavicle. Medial and lateral  Screws were then placed to hold the plate in place and while maintaining reduction the remaining screw holes were filled. 3.5  corticalscrews were used for fixation. Maintained good alignment of the fracture was seen. When this was completed K-wire was removed.   Good stability was seems with irrigation was done the clavicle and then the wound was closed. The deep tissue was closed with a 0 Vicryl in the platysma musculature over the plate. Thorough irrigation was then done of the entire area, then the subcu was closed with 2-0 Vicryl and the skin closed with running  absorbable subcuticular stitch. Steri-Strips were applied and the patient was placed in a sterile dressing and  Adhesive covering. The patient tolerated the procedure well. There were no complications. No drains were placed. Signed: Aissatou Santacruz MD (8/9/2018 at 11:15 AM)

## 2018-08-09 NOTE — ANESTHESIA PROCEDURE NOTES
Peripheral Block    Start time: 8/9/2018 7:21 AM  End time: 8/9/2018 7:28 AM  Performed by: Ibrahima Villaseñor by: Alma Denton       Pre-procedure: Indications: at surgeon's request and post-op pain management    Preanesthetic Checklist: patient identified, risks and benefits discussed, site marked, timeout performed, anesthesia consent given and patient being monitored    Timeout Time: 07:21          Block Type:   Block Type:   Interscalene  Laterality:  Left  Monitoring:  Standard ASA monitoring, continuous pulse ox, frequent vital sign checks, heart rate, responsive to questions and oxygen  Injection Technique:  Single shot  Procedures: ultrasound guided    Patient Position: supine  Prep: betadine and povidone-iodine 7.5% surgical scrub    Location:  Interscalene  Needle Type:  Stimuplex  Needle Gauge:  22 G  Needle Localization:  Nerve stimulator and ultrasound guidance  Motor Response: minimal motor response >0.4 mA    Medication Injected:  0.5%  ropivacaine  Volume (mL):  25    Assessment:  Number of attempts:  1  Injection Assessment:  Incremental injection every 5 mL, local visualized surrounding nerve on ultrasound, negative aspiration for blood, no paresthesia, negative aspiration for CSF and no intravascular symptoms  Patient tolerance:  Patient tolerated the procedure well with no immediate complications

## 2018-08-09 NOTE — ANESTHESIA POSTPROCEDURE EVALUATION
Post-Anesthesia Evaluation and Assessment    Patient: Thomes Boast MRN: [id number]  SSN: [id number]    YOB: 1980  Age: 41 y.o. Sex: female       Cardiovascular Function/Vital Signs  Visit Vitals    /61    Pulse (!) 58    Temp 36.7 °C (98.1 °F)    Resp 13    Ht 5' 8\" (1.727 m)    Wt 59 kg (130 lb)    SpO2 98%    BMI 19.77 kg/m2       Patient is status post general anesthesia for Procedure(s):  LEFT OPEN REDUCTION INTERNAL FIXATION OF CLAVICLE  (GENERAL WITH BLOCK). Nausea/Vomiting: None    Postoperative hydration reviewed and adequate. Pain:  Pain Scale 1: Numeric (0 - 10) (08/09/18 1110)  Pain Intensity 1: 0 (08/09/18 1110)   Managed    Neurological Status:   Neuro (WDL): Within Defined Limits (08/09/18 1110)   At baseline    Mental Status and Level of Consciousness: Arousable    Pulmonary Status:   O2 Device: Nasal cannula (08/09/18 1000)   Adequate oxygenation and airway patent    Complications related to anesthesia: None    Post-anesthesia assessment completed.  No concerns    Signed By: Lissett Webster MD     August 9, 2018

## 2018-08-09 NOTE — ROUTINE PROCESS
Patient: Flako Alatorre MRN: [id number]  SSN: [id number]   YOB: 1980  Age: 41 y.o. Sex: female     Patient is status post Procedure(s):  LEFT OPEN REDUCTION INTERNAL FIXATION OF CLAVICLE  (GENERAL WITH BLOCK).     Surgeon(s) and Role:     * Aissatou(Lorri) Dudley Paredes MD - Primary    Local/Dose/Irrigation:                    Peripheral IV 08/09/18 Right Wrist (Active)   Site Assessment Clean, dry, & intact 8/9/2018  6:58 AM   Phlebitis Assessment 0 8/9/2018  6:58 AM   Infiltration Assessment 0 8/9/2018  6:58 AM   Dressing Status Occlusive 8/9/2018  6:58 AM            Airway - Endotracheal Tube 08/09/18 Oral (Active)                   Dressing/Packing:     Splint/Cast:  ]    Other:

## 2018-08-09 NOTE — IP AVS SNAPSHOT
[address] 
[phone] Patient: Maggie Sparks MRN: [id number] ASM:[id number] About your hospitalization You were admitted on:  August 9, 2018 You last received care in the:  [facility] PACU You were discharged on:  August 9, 2018 Why you were hospitalized Your primary diagnosis was:  Not on File Follow-up Information Follow up With Details Comments Contact Info Yuridia Wheeler MD   [place] [address] [address] [place] [address] 
[phone] Discharge Orders None A check herbert indicates which time of day the medication should be taken. My Medications CONTINUE taking these medications Instructions Each Dose to Equal  
 Morning Noon Evening Bedtime HYDROcodone-acetaminophen 7.5-325 mg per tablet Commonly known as:  Josiah Ramachandran Your last dose was: Your next dose is: Take 1 Tab by mouth every eight (8) hours as needed for Pain. Max Daily Amount: 3 Tabs. 1 Tab  
    
   
   
   
  
 ibuprofen 600 mg tablet Commonly known as:  MOTRIN Your last dose was: Your next dose is: Take 1 Tab by mouth every eight (8) hours as needed for Pain. 600 mg  
    
   
   
   
  
 methocarbamol 750 mg tablet Commonly known as:  [id number] Your last dose was: Your next dose is: Take 1 Tab by mouth three (3) times daily. 750 mg  
    
   
   
   
  
 MIRENA 20 mcg/24 hr (5 years) Iud  
Generic drug:  levonorgestrel Your last dose was: Your next dose is:    
   
   
 1 Each by IntraUTERine route once. 1 Each  
    
   
   
   
  
 sertraline 100 mg tablet Commonly known as:  ZOLOFT Your last dose was: Your next dose is: TAKE 1 TABLET BY MOUTH EVERY DAY Opioid Education Prescription Opioids: What You Need to Know: Prescription opioids can be used to help relieve moderate-to-severe pain and are often prescribed following a surgery or injury, or for certain health conditions. These medications can be an important part of treatment but also come with serious risks. Opioids are strong pain medicines. Examples include hydrocodone, oxycodone, fentanyl, and morphine. Heroin is an example of an illegal opioid. It is important to work with your health care provider to make sure you are getting the safest, most effective care. WHAT ARE THE RISKS AND SIDE EFFECTS OF OPIOID USE? Prescription opioids carry serious risks of addiction and overdose, especially with prolonged use. An opioid overdose, often marked by slow breathing, can cause sudden death. The use of prescription opioids can have a number of side effects as well, even when taken as directed. · Tolerance-meaning you might need to take more of a medication for the same pain relief · Physical dependence-meaning you have symptoms of withdrawal when the medication is stopped. Withdrawal symptoms can include nausea, sweating, chills, diarrhea, stomach cramps, and muscle aches. Withdrawal can last up to several weeks, depending on which drug you took and how long you took it. · Increased sensitivity to pain · Constipation · Nausea, vomiting, and dry mouth · Sleepiness and dizziness · Confusion · Depression · Low levels of testosterone that can result in lower sex drive, energy, and strength · Itching and sweating RISKS ARE GREATER WITH:      
· History of drug misuse, substance use disorder, or overdose · Mental health conditions (such as depression or anxiety) · Sleep apnea · Older age (72 years or older) · Pregnancy Avoid alcohol while taking prescription opioids. Also, unless specifically advised by your health care provider, medications to avoid include: · Benzodiazepines (such as Xanax or Valium) · Muscle relaxants (such as Soma or Flexeril) · Hypnotics (such as Ambien or Lunesta) · Other prescription opioids KNOW YOUR OPTIONS Talk to your health care provider about ways to manage your pain that don't involve prescription opioids. Some of these options may actually work better and have fewer risks and side effects. Options may include: 
· Pain relievers such as acetaminophen, ibuprofen, and naproxen · Some medications that are also used for depression or seizures · Physical therapy and exercise · Counseling to help patients learn how to cope better with triggers of pain and stress. · Application of heat or cold compress · Massage therapy · Relaxation techniques Be Informed Make sure you know the name of your medication, how much and how often to take it, and its potential risks & side effects. IF YOU ARE PRESCRIBED OPIOIDS FOR PAIN: 
· Never take opioids in greater amounts or more often than prescribed. Remember the goal is not to be pain-free but to manage your pain at a tolerable level. · Follow up with your primary care provider to: · Work together to create a plan on how to manage your pain. · Talk about ways to help manage your pain that don't involve prescription opioids. · Talk about any and all concerns and side effects. · Help prevent misuse and abuse. · Never sell or share prescription opioids · Help prevent misuse and abuse. · Store prescription opioids in a secure place and out of reach of others (this may include visitors, children, friends, and family). · Safely dispose of unused/unwanted prescription opioids: Find your community drug take-back program or your pharmacy mail-back program, or flush them down the toilet, following guidance from the Food and Drug Administration (www.fda.gov/Drugs/ResourcesForYou). · Visit www.cdc.gov/drugoverdose to learn about the risks of opioid abuse and overdose.  
· If you believe you may be struggling with addiction, tell your health care provider and ask for guidance or call [facility] at 3-523-157-QAIT. Discharge Instructions POST OPERATIVE INSTRUCTIONS Left clavicle Fracture Aissatou Reddy MD III 1. First meal at home should be clear liquids. Progress to regular diet as tolerated. 2. Apply an ice bag for 20-30 minutes 4 times a day for the first 2-3 days to reduce swelling and pain and then use as desired. 3.  it is okay to remove the dressing on postop day 2. It is ok to shower but keep the  Wound clean and dry. If the wound gets them simply padded dry. Do not allow direct saturation of the incision. 4.   Wear sling when ambulating. It can be off for short periods when sitting but not when up ambulating or sleeping. Carlos Spies 5. Exercises- Move your fingers and elbow as tolerated. 6. Let pain be your guide, too much pain, too much activity 7. Medication Instructions are listed below: 
 
 continue taking Percocet for pain as needed. 8. If you develop a fever greater than 101, unexpected redness, or swelling, in your leg, please call the office immediately. Some bleeding and or drainage can be expected the first few days after surgery. 9. A post- op appointment has already been made for you  on August 20th. Please call the office if you need change the date or time of your appointment. 10. Thank you for following the above instructions. If you have any questions, please call the office and the  will put you in touch with one of my assistants. DISCHARGE SUMMARY from Nurse PATIENT INSTRUCTIONS: 
 
 
F-face looks uneven A-arms unable to move or move unevenly S-speech slurred or non-existent T-time-call 911 as soon as signs and symptoms begin-DO NOT go Back to bed or wait to see if you get better-TIME IS BRAIN. Warning Signs of HEART ATTACK Call 911 if you have these symptoms: 
? Chest discomfort. Most heart attacks involve discomfort in the center of the chest that lasts more than a few minutes, or that goes away and comes back. It can feel like uncomfortable pressure, squeezing, fullness, or pain. ? Discomfort in other areas of the upper body. Symptoms can include pain or discomfort in one or both arms, the back, neck, jaw, or stomach. ? Shortness of breath with or without chest discomfort. ? Other signs may include breaking out in a cold sweat, nausea, or lightheadedness. Don't wait more than five minutes to call [address]! Fast action can save your life. Calling 911 is almost always the fastest way to get lifesaving treatment. Emergency Medical Services staff can begin treatment when they arrive  up to an hour sooner than if someone gets to the hospital by car. The discharge information has been reviewed with the parent. The parent verbalized understanding. Discharge medications reviewed with the parent and appropriate educational materials and side effects teaching were provided. ___________________________________________________________________________________________________________________________________ Introducing [facility]! Dear Karine Wright: Thank you for requesting a [employer] account. Our records indicate that you already have an active [employer] account. You can access your account anytime at [email or web address]://[employer]. Dtime/[employer] Did you know that you can access your hospital and ER discharge instructions at any time in [employer]? You can also review all of your test results from your hospital stay or ER visit. Additional Information If you have questions, please visit the Frequently Asked Questions section of the MyChart website at [email or web address] Remember, [employer] is NOT to be used for urgent needs. For medical emergencies, dial 911. Now available from your iPhone and Android! Introducing Octaviano Burrows As a [facility] patient, I wanted to make you aware of our electronic visit tool called Octaviano Burrows. [facility] [employer] allows you to connect within minutes with a medical provider 24 hours a day, seven days a week via a mobile device or tablet or logging into a secure website from your computer. You can access Octaviano Burrows from anywhere in the [place]. A virtual visit might be right for you when you have a simple condition and feel like you just dont want to get out of bed, or cant get away from work for an appointment, when your regular [facility] provider is not available (evenings, weekends or holidays), or when youre out of town and need minor care. Electronic visits cost only $49 and if the [facility] [employer] provider determines a prescription is needed to treat your condition, one can be electronically transmitted to a nearby pharmacy*. Please take a moment to enroll today if you have not already done so. The enrollment process is free and takes just a few minutes. To enroll, please download the [employer] libia to your tablet or phone, or visit [email or web address] to enroll on your computer. And, as an [address] [place] [address] patient with a [employer] account, the results of your visits will be scanned into your electronic medical record and your primary care provider will be able to view the scanned results. We urge you to continue to see your regular [facility] provider for your ongoing medical care.   And while your primary care provider may not be the one available when you seek a Octaviano Burrows virtual visit, the peace of mind you get from getting a real diagnosis real time can be priceless. For more information on Octaviano Burrows, view our Frequently Asked Questions (FAQs) at [email or web address]. Sincerely, 
 
Farhat Boss MD 
Chief Medical Officer [address] *:  certain medications cannot be prescribed via Octaviano Burrows Providers Seen During Your Hospitalization Provider Specialty Primary office phone Julmeng(Lorri) Dudley Paredes MD Orthopedic Surgery [phone] Your Primary Care Physician (PCP) Primary Care Physician Office Phone Office Fax [place] [facility] [phone] You are allergic to the following No active allergies Recent Documentation Height Weight BMI OB Status Smoking Status 1.727 m 59 kg 19.77 kg/m2 IUD Never Smoker Emergency Contacts Name Discharge Info Relation Home Work Mobile [employer] CAREGIVER [3] Spouse [3] [phone] Patient Belongings The following personal items are in your possession at time of discharge: 
  Dental Appliances: None                Clothing:  (clothes in bag) Please provide this summary of care documentation to your next provider. Signatures-by signing, you are acknowledging that this After Visit Summary has been reviewed with you and you have received a copy. Patient Signature:  ____________________________________________________________ Date:  ____________________________________________________________  
  
[facility] Provider Signature:  ____________________________________________________________ Date:  ____________________________________________________________

## 2018-08-10 DIAGNOSIS — F41.9 ANXIETY: ICD-10-CM

## 2018-08-10 RX ORDER — SERTRALINE HYDROCHLORIDE 100 MG/1
100 TABLET, FILM COATED ORAL DAILY
Qty: 90 TAB | Refills: 0 | Status: SHIPPED | OUTPATIENT
Start: 2018-08-10 | End: 2019-04-26 | Stop reason: SDUPTHER
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
## 2018-08-23 LAB
ALBUMIN SERPL-MCNC: 4.4 G/DL (ref 3.5–5.5)
ALBUMIN/GLOB SERPL: 1.6 {RATIO} (ref 1.2–2.2)
ALP SERPL-CCNC: 48 IU/L (ref 39–117)
ALT SERPL-CCNC: 12 IU/L (ref 0–32)
AST SERPL-CCNC: 17 IU/L (ref 0–40)
BILIRUB SERPL-MCNC: 0.3 MG/DL (ref 0–1.2)
BUN SERPL-MCNC: 14 MG/DL (ref 6–20)
BUN/CREAT SERPL: 19 (ref 9–23)
CALCIUM SERPL-MCNC: 9.1 MG/DL (ref 8.7–10.2)
CHLORIDE SERPL-SCNC: 98 MMOL/L (ref 96–106)
CHOLEST SERPL-MCNC: 158 MG/DL (ref 100–199)
CO2 SERPL-SCNC: 25 MMOL/L (ref 20–29)
CREAT SERPL-MCNC: 0.72 MG/DL (ref 0.57–1)
GLOBULIN SER CALC-MCNC: 2.8 G/DL (ref 1.5–4.5)
GLUCOSE SERPL-MCNC: 87 MG/DL (ref 65–99)
HDLC SERPL-MCNC: 60 MG/DL
LDLC SERPL CALC-MCNC: 85 MG/DL (ref 0–99)
POTASSIUM SERPL-SCNC: 4.3 MMOL/L (ref 3.5–5.2)
PROT SERPL-MCNC: 7.2 G/DL (ref 6–8.5)
SODIUM SERPL-SCNC: 140 MMOL/L (ref 134–144)
TRIGL SERPL-MCNC: 65 MG/DL (ref 0–149)
VLDLC SERPL CALC-MCNC: 13 MG/DL (ref 5–40)

## 2018-08-23 NOTE — PROGRESS NOTES
Results released to patient via [facility]. All labs are stable or at goal for her. Labs ordered on 3/30/18 and completed 8/22/18.

## 2018-08-27 ENCOUNTER — OFFICE VISIT (OUTPATIENT)
Dept: INTERNAL MEDICINE CLINIC | Age: 38
End: 2018-08-27

## 2018-08-27 VITALS
WEIGHT: 130 LBS | HEIGHT: 68 IN | DIASTOLIC BLOOD PRESSURE: 56 MMHG | RESPIRATION RATE: 12 BRPM | OXYGEN SATURATION: 98 % | BODY MASS INDEX: 19.7 KG/M2 | SYSTOLIC BLOOD PRESSURE: 98 MMHG | HEART RATE: 62 BPM | TEMPERATURE: 98 F

## 2018-08-27 DIAGNOSIS — F41.9 ANXIETY: Primary | ICD-10-CM

## 2018-08-27 NOTE — PROGRESS NOTES
Fatoumata Boss is a 41 y.o. female who was seen in clinic today (8/27/2018). Assessment & Plan:   Diagnoses and all orders for this visit:    1. Anxiety- well controlled, reviewed treatment options with her, reviewed life style changes to help improve mood, continue current treatment. Follow-up Disposition:  Return in about 1 year (around 8/27/2019), or if symptoms worsen or fail to improve, for Regular follow up. Subjective:   Kelsi Talbot was seen today for [address] Review  Patient is seen for anxiety. Since last visit: we did try to lower her dose from 100mg to 75mg but she reports anxiety level went up again. GAD7 reviewed. She denies any: feelings of apprehension/worry, panic attacks, poor concentration/attention and depression. Reports experiences the following side effects from the treatment: none. Brief Labs:     Lab Results   Component Value Date/Time    Sodium 140 08/22/2018 09:16 AM    Potassium 4.3 08/22/2018 09:16 AM    Creatinine 0.72 08/22/2018 09:16 AM    Cholesterol, total 158 08/22/2018 09:16 AM    HDL Cholesterol 60 08/22/2018 09:16 AM    LDL, calculated 85 08/22/2018 09:16 AM    Triglyceride 65 08/22/2018 09:16 AM          Prior to Admission medications    Medication Sig Start Date End Date Taking? Authorizing Provider   sertraline (ZOLOFT) 100 mg tablet Take 1 Tab by mouth daily. 8/10/18  Yes Myrna Zacarias MD   levonorgestrel (MIRENA) 20 mcg/24 hour (5 years) IUD 1 Each by IntraUTERine route once. Yes Historical Provider          No Known Allergies        Review of Systems   Respiratory: Negative for cough and shortness of breath. Cardiovascular: Negative for chest pain and palpitations. Gastrointestinal: Negative for abdominal pain, constipation, diarrhea, nausea and vomiting. Musculoskeletal: Positive for joint pain (L shoulder). Objective:   Physical Exam   Constitutional: No distress.    Cardiovascular: Regular rhythm and normal heart sounds. No murmur heard. Pulmonary/Chest: Effort normal and breath sounds normal. She has no wheezes. She has no rales. Well healing surgical scar on L clavicle   Musculoskeletal:   left arm in a sling   Psychiatric: She has a normal mood and affect. Her behavior is normal.         Visit Vitals    BP 98/56    Pulse 62    Temp 98 °F (36.7 °C) (Oral)    Resp 12    Ht 5' 8\" (1.727 m)    Wt 130 lb (59 kg)    SpO2 98%    BMI 19.77 kg/m2         Disclaimer:  Advised her to call back or return to office if symptoms worsen/change/persist.  Discussed expected course/resolution/complications of diagnosis in detail with patient. Medication risks/benefits/costs/interactions/alternatives discussed with patient. She was given an after visit summary which includes diagnoses, current medications, & vitals. She expressed understanding with the diagnosis and plan. Aspects of this note may have been generated using voice recognition software. Despite editing, there may be some syntax errors.        Yolie Tong MD

## 2018-08-27 NOTE — PATIENT INSTRUCTIONS
Adjustment Disorder: Care Instructions  Your Care Instructions    Adjustment disorder means that you have emotional or behavioral problems because of stress. But your response to the stress is far more severe than a normal response. It is severe enough to affect your work or social life and may cause depression and physical pains and problems. Events that may cause this response can include a divorce, money problems, or starting school or a new job. It might be anything that causes some stress. This disorder is most often a short-term problem. It happens within 3 months of the stressful event or change. If the response lasts longer than 6 months after the event ends, you may have a more serious disorder. Follow-up care is a key part of your treatment and safety. Be sure to make and go to all appointments, and call your doctor if you are having problems. It's also a good idea to know your test results and keep a list of the medicines you take. How can you care for yourself at home? · Go to all counseling sessions. Do not skip any because you are feeling better. · If your doctor prescribed medicines, take them exactly as prescribed. Call your doctor if you think you are having a problem with your medicine. You will get more details on the specific medicines your doctor prescribes. · Discuss the causes of your stress with a good friend or family member. Or you can join a support group for people with similar problems. Talking to others sometimes relieves stress. · Get at least 30 minutes of exercise on most days of the week. Walking is a good choice. You also may want to do other activities, such as running, swimming, cycling, or playing tennis or team sports. Relaxation techniques  Do relaxation exercises 10 to 20 minutes a day. You can play soothing, relaxing music while you do them, if you wish. · Tell others in your house that you are going to do your relaxation exercises.  Ask them not to disturb you.  · Find a comfortable, quiet place. · Lie down on your back, or sit with your back straight. · Focus on your breathing. Make it slow and steady. · Breathe in through your nose. Breathe out through either your nose or mouth. · Breathe deeply, filling up the area between your navel and your rib cage. Breathe so that your belly goes up and down. · Do not hold your breath. · Breathe like this for 5 to 10 minutes. Notice the feeling of calmness throughout your whole body. As you continue to breathe slowly and deeply, relax by doing these next steps for another 5 to 10 minutes:  · Tighten and relax each muscle group in your body. Start at your toes, and work your way up to your head. · Imagine your muscle groups relaxing and getting heavy. · Empty your mind of all thoughts. · Let yourself relax more and more deeply. · Be aware of the state of calmness that surrounds you. · When your relaxation time is over, you can bring yourself back to alertness by moving your fingers and toes. Then move your hands and feet. And then move your entire body. Sometimes people fall asleep during relaxation. But they most often wake up soon. · Always give yourself time to return to full alertness before you drive a car. Wait to do anything that might cause an accident if you are not fully alert. Never play a relaxation tape while you drive a car. When should you call for help? Call 911 anytime you think you may need emergency care. For example, call if:    · You feel you cannot stop from hurting yourself or someone else. Keep the numbers for these national suicide hotlines: 5-158-948-TALK (2-181-858-809.966.1680) and 1-027-ODLRIUD (7-519.284.3854).  If you or someone you know talks about suicide or feeling hopeless, get help right away.    Watch closely for changes in your health, and be sure to contact your doctor if:    · You have new anxiety, or your anxiety gets worse.     · You have been feeling sad, depressed, or hopeless or have lost interest in things that you usually enjoy.     · You do not get better as expected. Where can you learn more? Go to http://kameron-enrico.info/. Enter 0688 [id number] 05 65 in the search box to learn more about \"Adjustment Disorder: Care Instructions. \"  Current as of: October 10, 2017  Content Version: 11.7  © 1101-2638 [employer]. Care instructions adapted under license by [employer] (which disclaims liability or warranty for this information). If you have questions about a medical condition or this instruction, always ask your healthcare professional. [place] [address] any warranty or liability for your use of this information.

## 2019-04-26 DIAGNOSIS — F41.9 ANXIETY: ICD-10-CM

## 2019-04-26 RX ORDER — SERTRALINE HYDROCHLORIDE 100 MG/1
TABLET, FILM COATED ORAL
Qty: 90 TAB | Refills: 1 | Status: SHIPPED | OUTPATIENT
Start: 2019-04-26 | End: 2019-10-23 | Stop reason: SDUPTHER

## 2019-08-30 ENCOUNTER — OFFICE VISIT (OUTPATIENT)
Dept: INTERNAL MEDICINE CLINIC | Age: 39
End: 2019-08-30

## 2019-08-30 VITALS
OXYGEN SATURATION: 98 % | RESPIRATION RATE: 12 BRPM | HEIGHT: 68 IN | DIASTOLIC BLOOD PRESSURE: 62 MMHG | BODY MASS INDEX: 19.1 KG/M2 | WEIGHT: 126 LBS | TEMPERATURE: 97.9 F | HEART RATE: 67 BPM | SYSTOLIC BLOOD PRESSURE: 100 MMHG

## 2019-08-30 DIAGNOSIS — F41.9 ANXIETY: ICD-10-CM

## 2019-08-30 DIAGNOSIS — Z00.00 ROUTINE PHYSICAL EXAMINATION: Primary | ICD-10-CM

## 2019-08-30 DIAGNOSIS — K62.3 PARTIAL RECTAL PROLAPSE: ICD-10-CM

## 2019-08-30 NOTE — PATIENT INSTRUCTIONS
Well Visit, Ages 25 to 48: Care Instructions  Your Care Instructions    Physical exams can help you stay healthy. Your doctor has checked your overall health and may have suggested ways to take good care of yourself. He or she also may have recommended tests. At home, you can help prevent illness with healthy eating, regular exercise, and other steps. Follow-up care is a key part of your treatment and safety. Be sure to make and go to all appointments, and call your doctor if you are having problems. It's also a good idea to know your test results and keep a list of the medicines you take. How can you care for yourself at home? · Reach and stay at a healthy weight. This will lower your risk for many problems, such as obesity, diabetes, heart disease, and high blood pressure. · Get at least 30 minutes of physical activity on most days of the week. Walking is a good choice. You also may want to do other activities, such as running, swimming, cycling, or playing tennis or team sports. Discuss any changes in your exercise program with your doctor. · Do not smoke or allow others to smoke around you. If you need help quitting, talk to your doctor about stop-smoking programs and medicines. These can increase your chances of quitting for good. · Talk to your doctor about whether you have any risk factors for sexually transmitted infections (STIs). Having one sex partner (who does not have STIs and does not have sex with anyone else) is a good way to avoid these infections. · Use birth control if you do not want to have children at this time. Talk with your doctor about the choices available and what might be best for you. · Protect your skin from too much sun. When you're outdoors from 10 a.m. to 4 p.m., stay in the shade or cover up with clothing and a hat with a wide brim. Wear sunglasses that block UV rays. Even when it's cloudy, put broad-spectrum sunscreen (SPF 30 or higher) on any exposed skin.   · See a dentist one or two times a year for checkups and to have your teeth cleaned. · Wear a seat belt in the car. Follow your doctor's advice about when to have certain tests. These tests can spot problems early. For everyone  · Cholesterol. Have the fat (cholesterol) in your blood tested after age 21. Your doctor will tell you how often to have this done based on your age, family history, or other things that can increase your risk for heart disease. · Blood pressure. Have your blood pressure checked during a routine doctor visit. Your doctor will tell you how often to check your blood pressure based on your age, your blood pressure results, and other factors. · Vision. Talk with your doctor about how often to have a glaucoma test.  · Diabetes. Ask your doctor whether you should have tests for diabetes. · Colon cancer. Your risk for colorectal cancer gets higher as you get older. Some experts say that adults should start regular screening at age 48 and stop at age 76. Others say to start before age 48 or continue after age 76. Talk with your doctor about your risk and when to start and stop screening. For women  · Breast exam and mammogram. Talk to your doctor about when you should have a clinical breast exam and a mammogram. Medical experts differ on whether and how often women under 50 should have these tests. Your doctor can help you decide what is right for you. · Cervical cancer screening test and pelvic exam. Begin with a Pap test at age 24. The test often is part of a pelvic exam. Starting at age 27, you may choose to have a Pap test, an HPV test, or both tests at the same time (called co-testing). Talk with your doctor about how often to have testing. · Tests for sexually transmitted infections (STIs). Ask whether you should have tests for STIs. You may be at risk if you have sex with more than one person, especially if your partners do not wear condoms.   For men  · Tests for sexually transmitted infections (STIs). Ask whether you should have tests for STIs. You may be at risk if you have sex with more than one person, especially if you do not wear a condom. · Testicular cancer exam. Ask your doctor whether you should check your testicles regularly. · Prostate exam. Talk to your doctor about whether you should have a blood test (called a PSA test) for prostate cancer. Experts differ on whether and when men should have this test. Some experts suggest it if you are older than 39 and are -American or have a father or brother who got prostate cancer when he was younger than 72. When should you call for help? Watch closely for changes in your health, and be sure to contact your doctor if you have any problems or symptoms that concern you. Where can you learn more? Go to http://kameron-enrico.info/. Enter P072 in the search box to learn more about \"Well Visit, Ages 25 to 48: Care Instructions. \"  Current as of: December 13, 2018  Content Version: 12.1  © 2440-4300 Healthwise, Incorporated. Care instructions adapted under license by [employer] (which disclaims liability or warranty for this information). If you have questions about a medical condition or this instruction, always ask your healthcare professional. [place] [address] any warranty or liability for your use of this information.

## 2019-08-30 NOTE — PROGRESS NOTES
Sudeep Young is a 46 y.o. female who was seen in clinic today (8/30/2019) for a full physical.       Assessment & Plan:   Diagnoses and all orders for this visit:    1. Routine physical examination       -     Previous labs reviewed & discussed with her, will defer CBC    2. Anxiety- well controlled, continue current treatment, did not tolerate lower dose in the past    3. Partial rectal prolapse- she reports it is improved but still an issue, recommended to f/u with specialist, records requested. Follow-up and Dispositions    · Return in about 1 year (around 8/30/2020) for FULL PHYSICAL - 30 minutes. ------------------------------------------------------------------------------------------    Subjective:   Shira Wiggins is here today for a full physical.      Health Maintenance  Immunizations:   Influenza: she will plan on getting it this fall   Tetanus: up to date    Cancer screening:   Cervical: reviewed guidelines, up to date  Breast: reviewed guidelines, n/a. Patient Care Team:  Darvin Santiago MD as PCP - General (Internal Medicine)  Jaime Harris MD (Obstetrics & Gynecology)         The following sections were reviewed & updated as appropriate: PMH, PSH, FH, and SH. Patient Active Problem List   Diagnosis Code    Anxiety F41.9    Allergic rhinitis, seasonal J30.2          Prior to Admission medications    Medication Sig Start Date End Date Taking? Authorizing Provider   sertraline (ZOLOFT) 100 mg tablet TAKE 1 TABLET BY MOUTH EVERY DAY 4/26/19  Yes Darvin Santiago MD          No Known Allergies           Review of Systems   Constitutional: Negative for chills and fever. Respiratory: Negative for cough and shortness of breath. Cardiovascular: Negative for chest pain and palpitations. Gastrointestinal: Negative for abdominal pain, blood in stool, constipation, diarrhea, heartburn, nausea and vomiting.         She saw CRS and was told she had a partial prolapse. She reports symptoms are still intermittent and not as bad. She thinks once/month, easily reducible   Musculoskeletal: Negative for joint pain and myalgias. Neurological: Negative for tingling, focal weakness and headaches. Endo/Heme/Allergies: Does not bruise/bleed easily. Psychiatric/Behavioral: Negative for depression. The patient is not nervous/anxious and does not have insomnia. Objective:   Physical Exam   Constitutional: She appears well-developed. No distress. HENT:   Right Ear: Tympanic membrane, external ear and ear canal normal.   Left Ear: Tympanic membrane, external ear and ear canal normal.   Nose: Nose normal.   Mouth/Throat: Uvula is midline, oropharynx is clear and moist and mucous membranes are normal. No posterior oropharyngeal erythema. Eyes: Conjunctivae and lids are normal. No scleral icterus. Neck: Neck supple. No thyromegaly present. Cardiovascular: Regular rhythm and normal heart sounds. No murmur heard. Pulses:       Dorsalis pedis pulses are 2+ on the right side, and 2+ on the left side. Posterior tibial pulses are 2+ on the right side, and 2+ on the left side. Pulmonary/Chest: Effort normal and breath sounds normal. She has no wheezes. She has no rales. Abdominal: Soft. Bowel sounds are normal. She exhibits no mass. There is no hepatosplenomegaly. There is no tenderness. Musculoskeletal: Normal range of motion. She exhibits no edema. Cervical back: Normal.        Thoracic back: She exhibits no bony tenderness. Lumbar back: Normal.   Lymphadenopathy:     She has no cervical adenopathy. Neurological: She has normal strength. No sensory deficit. Skin: No rash noted. Psychiatric: She has a normal mood and affect.  Her behavior is normal.          Visit Vitals  /62   Pulse 67   Temp 97.9 °F (36.6 °C) (Oral)   Resp 12   Ht 5' 8.1\" (1.73 m)   Wt 126 lb (57.2 kg)   LMP 08/03/2019   SpO2 98%   BMI 19.10 kg/m² Advised her to call back or return to office if symptoms worsen/change/persist.  Discussed expected course/resolution/complications of diagnosis in detail with patient. Medication risks/benefits/costs/interactions/alternatives discussed with patient. She was given an after visit summary which includes diagnoses, current medications, & vitals. She expressed understanding with the diagnosis and plan. Aspects of this note may have been generated using voice recognition software. Despite editing, there may be some syntax errors.        Gerardo Aldridge MD

## 2019-10-23 DIAGNOSIS — F41.9 ANXIETY: ICD-10-CM

## 2019-10-23 RX ORDER — SERTRALINE HYDROCHLORIDE 100 MG/1
TABLET, FILM COATED ORAL
Qty: 90 TAB | Refills: 1 | Status: SHIPPED | OUTPATIENT
Start: 2019-10-23 | End: 2020-05-03

## 2020-05-03 DIAGNOSIS — F41.9 ANXIETY: ICD-10-CM

## 2020-05-03 RX ORDER — SERTRALINE HYDROCHLORIDE 100 MG/1
TABLET, FILM COATED ORAL
Qty: 30 TAB | Refills: 3 | Status: SHIPPED | OUTPATIENT
Start: 2020-05-03 | End: 2020-09-04 | Stop reason: SDUPTHER

## 2020-09-04 ENCOUNTER — OFFICE VISIT (OUTPATIENT)
Dept: INTERNAL MEDICINE CLINIC | Age: 40
End: 2020-09-04
Payer: COMMERCIAL

## 2020-09-04 VITALS
TEMPERATURE: 97.8 F | HEART RATE: 45 BPM | WEIGHT: 127 LBS | RESPIRATION RATE: 18 BRPM | HEIGHT: 68 IN | BODY MASS INDEX: 19.25 KG/M2 | OXYGEN SATURATION: 100 % | SYSTOLIC BLOOD PRESSURE: 102 MMHG | DIASTOLIC BLOOD PRESSURE: 62 MMHG

## 2020-09-04 DIAGNOSIS — Z71.89 EDUCATED ABOUT COVID-19 VIRUS INFECTION: ICD-10-CM

## 2020-09-04 DIAGNOSIS — F41.9 ANXIETY: ICD-10-CM

## 2020-09-04 DIAGNOSIS — Z00.00 ROUTINE PHYSICAL EXAMINATION: Primary | ICD-10-CM

## 2020-09-04 PROCEDURE — 99395 PREV VISIT EST AGE 18-39: CPT | Performed by: INTERNAL MEDICINE

## 2020-09-04 RX ORDER — SERTRALINE HYDROCHLORIDE 100 MG/1
TABLET, FILM COATED ORAL
Qty: 90 TAB | Refills: 3 | Status: SHIPPED | OUTPATIENT
Start: 2020-09-04 | End: 2021-08-20

## 2020-09-04 NOTE — PATIENT INSTRUCTIONS
Well Visit, Ages 25 to 48: Care Instructions Your Care Instructions Physical exams can help you stay healthy. Your doctor has checked your overall health and may have suggested ways to take good care of yourself. He or she also may have recommended tests. At home, you can help prevent illness with healthy eating, regular exercise, and other steps. Follow-up care is a key part of your treatment and safety. Be sure to make and go to all appointments, and call your doctor if you are having problems. It's also a good idea to know your test results and keep a list of the medicines you take. How can you care for yourself at home? · Reach and stay at a healthy weight. This will lower your risk for many problems, such as obesity, diabetes, heart disease, and high blood pressure. · Get at least 30 minutes of physical activity on most days of the week. Walking is a good choice. You also may want to do other activities, such as running, swimming, cycling, or playing tennis or team sports. Discuss any changes in your exercise program with your doctor. · Do not smoke or allow others to smoke around you. If you need help quitting, talk to your doctor about stop-smoking programs and medicines. These can increase your chances of quitting for good. · Talk to your doctor about whether you have any risk factors for sexually transmitted infections (STIs). Having one sex partner (who does not have STIs and does not have sex with anyone else) is a good way to avoid these infections. · Use birth control if you do not want to have children at this time. Talk with your doctor about the choices available and what might be best for you. · Protect your skin from too much sun. When you're outdoors from 10 a.m. to 4 p.m., stay in the shade or cover up with clothing and a hat with a wide brim. Wear sunglasses that block UV rays. Even when it's cloudy, put broad-spectrum sunscreen (SPF 30 or higher) on any exposed skin. · See a dentist one or two times a year for checkups and to have your teeth cleaned. · Wear a seat belt in the car. Follow your doctor's advice about when to have certain tests. These tests can spot problems early. For everyone · Cholesterol. Have the fat (cholesterol) in your blood tested after age 21. Your doctor will tell you how often to have this done based on your age, family history, or other things that can increase your risk for heart disease. · Blood pressure. Have your blood pressure checked during a routine doctor visit. Your doctor will tell you how often to check your blood pressure based on your age, your blood pressure results, and other factors. · Vision. Talk with your doctor about how often to have a glaucoma test. 
· Diabetes. Ask your doctor whether you should have tests for diabetes. · Colon cancer. Your risk for colorectal cancer gets higher as you get older. Some experts say that adults should start regular screening at age 48 and stop at age 76. Others say to start before age 48 or continue after age 76. Talk with your doctor about your risk and when to start and stop screening. For women · Breast exam and mammogram. Talk to your doctor about when you should have a clinical breast exam and a mammogram. Medical experts differ on whether and how often women under 50 should have these tests. Your doctor can help you decide what is right for you. · Cervical cancer screening test and pelvic exam. Begin with a Pap test at age 24. The test often is part of a pelvic exam. Starting at age 27, you may choose to have a Pap test, an HPV test, or both tests at the same time (called co-testing). Talk with your doctor about how often to have testing. · Tests for sexually transmitted infections (STIs). Ask whether you should have tests for STIs. You may be at risk if you have sex with more than one person, especially if your partners do not wear condoms. For men · Tests for sexually transmitted infections (STIs). Ask whether you should have tests for STIs. You may be at risk if you have sex with more than one person, especially if you do not wear a condom. · Testicular cancer exam. Ask your doctor whether you should check your testicles regularly. · Prostate exam. Talk to your doctor about whether you should have a blood test (called a PSA test) for prostate cancer. Experts differ on whether and when men should have this test. Some experts suggest it if you are older than 39 and are -American or have a father or brother who got prostate cancer when he was younger than 72. When should you call for help? Watch closely for changes in your health, and be sure to contact your doctor if you have any problems or symptoms that concern you. Where can you learn more? Go to http://www.bello.com/ Enter P072 in the search box to learn more about \"Well Visit, Ages 25 to 48: Care Instructions. \" Current as of: May 27, 2020               Content Version: 12.6 © 2006-2020 [employer]. Care instructions adapted under license by [employer] (which disclaims liability or warranty for this information). If you have questions about a medical condition or this instruction, always ask your healthcare professional. [address] any warranty or liability for your use of this information.

## 2020-09-04 NOTE — PROGRESS NOTES
Abdi Disla is a 45 y.o. female who was seen in clinic today (9/4/2020) for a full physical.       Assessment & Plan:     Diagnoses and all orders for this visit:    1. Routine physical examination       -     Previous labs reviewed & discussed with her, will defer  2. Educated about COVID-19 virus infection    3. Anxiety- stable, continue current treatment   -     sertraline (ZOLOFT) 100 mg tablet; TAKE 1 TABLET BY MOUTH EVERY DAY      Follow-up and Dispositions    · Return in about 1 year (around 9/4/2021) for FULL PHYSICAL - 30 minutes. Subjective:   Izaiah Muñoz is here today for a full physical.        Since last visit: no changes    Health Maintenance  Immunizations:   Influenza: she will plan on getting it this fall   Tetanus: up to date    Cancer screening:   Cervical: reviewed guidelines, up to date  Breast: reviewed guidelines, mammogram n/a. In addition to the above issues we also reviewed the following acute and/or chronic problems:    Mental Health Review  Patient is seen for anxiety. GAD7 reviewed. Reports experiences the following side effects from the treatment: none. THOMAS 2/7 9/4/2020   Feeling nervous, anxious or on edge? 0   Not being able to stop or control worrying? 0   THOMAS-2 Subtotal 0   Worrying too much about different things? 0   Trouble relaxing? 0   Being so restless that it is hard to sit still? 0   Becoming easily annoyed or irritable? 0   Feeling afraid as if something awful might happen? 0   THOMAS-7 Total Score 0   If you checked off any problems, how difficult have these problems made it for you to do your work, take care of thinks at home, or get along with other people?   Not at all difficult              Patient Care Team:  Sonia Gurrola MD as PCP - General (Internal Medicine)  Sonia Gurrola MD as PCP - [facility] Empaneled Provider  Dony Cortez MD (Obstetrics & Gynecology)         The following sections were reviewed & updated as appropriate: Allergies, PMH, PSH, FH, and SH. Patient Active Problem List   Diagnosis Code    Anxiety F41.9    Allergic rhinitis, seasonal J30.2          Prior to Admission medications    Medication Sig Start Date End Date Taking? Authorizing Provider   sertraline (ZOLOFT) 100 mg tablet TAKE 1 TABLET BY MOUTH EVERY DAY 5/3/20  Yes Dejon Nichols MD          Review of Systems   Constitutional: Negative for chills and fever. Respiratory: Negative for cough and shortness of breath. Cardiovascular: Negative for chest pain and palpitations. Gastrointestinal: Negative for abdominal pain, blood in stool, constipation, diarrhea, heartburn, nausea and vomiting. Musculoskeletal: Negative for joint pain and myalgias. Neurological: Negative for tingling, focal weakness and headaches. Endo/Heme/Allergies: Does not bruise/bleed easily. Psychiatric/Behavioral: Negative for depression. The patient is not nervous/anxious and does not have insomnia. Objective:   Physical Exam  Constitutional:       General: She is not in acute distress. Appearance: She is well-developed. Eyes:      Conjunctiva/sclera: Conjunctivae normal.   Cardiovascular:      Rate and Rhythm: Regular rhythm. Bradycardia present. Heart sounds: Normal heart sounds. No murmur. Pulmonary:      Effort: Pulmonary effort is normal.      Breath sounds: Normal breath sounds. Abdominal:      General: Bowel sounds are normal.      Palpations: Abdomen is soft. Tenderness: There is no abdominal tenderness. Musculoskeletal:      Right lower leg: No edema. Left lower leg: No edema. Lymphadenopathy:      Cervical: No cervical adenopathy.    Psychiatric:         Mood and Affect: Mood and affect normal.            Visit Vitals  /62 (BP 1 Location: Right arm)   Pulse (!) 45   Temp 97.8 °F (36.6 °C) (Temporal)   Resp 18   Ht 5' 8\" (1.727 m)   Wt 127 lb (57.6 kg)   LMP 08/26/2020   SpO2 100%   BMI 19.31 kg/m²          Advised her to call back or return to office if symptoms worsen/change/persist.  Discussed expected course/resolution/complications of diagnosis in detail with patient. Medication risks/benefits/costs/interactions/alternatives discussed with patient. She was given an after visit summary which includes diagnoses, current medications, & vitals. She expressed understanding with the diagnosis and plan. Aspects of this note may have been generated using voice recognition software. Despite editing, there may be some syntax errors.        Ele Spears MD

## 2020-11-05 NOTE — PROGRESS NOTES
CPE.  Broke collar bone last August. M-Plasty Complex Repair Preamble Text (Leave Blank If You Do Not Want): Extensive wide undermining was performed.

## 2021-08-20 DIAGNOSIS — F41.9 ANXIETY: ICD-10-CM

## 2021-08-20 RX ORDER — SERTRALINE HYDROCHLORIDE 100 MG/1
TABLET, FILM COATED ORAL
Qty: 90 TABLET | Refills: 1 | Status: SHIPPED | OUTPATIENT
Start: 2021-08-20 | End: 2022-02-23

## 2021-08-21 NOTE — TELEPHONE ENCOUNTER
Med refilled when I was out of office. Due for f/u at this time, provided 90 days + 1 RF.     Please call, needs to schedule regular f/u in Sept/Oct.

## 2021-08-23 ENCOUNTER — TELEPHONE (OUTPATIENT)
Dept: INTERNAL MEDICINE CLINIC | Age: 41
End: 2021-08-23

## 2021-10-15 ENCOUNTER — OFFICE VISIT (OUTPATIENT)
Dept: INTERNAL MEDICINE CLINIC | Age: 41
End: 2021-10-15
Payer: COMMERCIAL

## 2021-10-15 VITALS
SYSTOLIC BLOOD PRESSURE: 124 MMHG | TEMPERATURE: 98.6 F | HEIGHT: 68 IN | WEIGHT: 127 LBS | DIASTOLIC BLOOD PRESSURE: 72 MMHG | BODY MASS INDEX: 19.25 KG/M2 | OXYGEN SATURATION: 98 % | HEART RATE: 70 BPM | RESPIRATION RATE: 12 BRPM

## 2021-10-15 DIAGNOSIS — Z00.00 ROUTINE PHYSICAL EXAMINATION: Primary | ICD-10-CM

## 2021-10-15 DIAGNOSIS — S62.512D CLOSED DISPLACED FRACTURE OF PROXIMAL PHALANX OF LEFT THUMB WITH ROUTINE HEALING, SUBSEQUENT ENCOUNTER: ICD-10-CM

## 2021-10-15 DIAGNOSIS — Z12.31 ENCOUNTER FOR SCREENING MAMMOGRAM FOR MALIGNANT NEOPLASM OF BREAST: ICD-10-CM

## 2021-10-15 DIAGNOSIS — F41.9 ANXIETY: ICD-10-CM

## 2021-10-15 PROCEDURE — 99396 PREV VISIT EST AGE 40-64: CPT | Performed by: INTERNAL MEDICINE

## 2021-10-15 NOTE — PATIENT INSTRUCTIONS
Well Visit, Ages 25 to 48: Care Instructions  Overview     Well visits can help you stay healthy. Your doctor has checked your overall health and may have suggested ways to take good care of yourself. Your doctor also may have recommended tests. At home, you can help prevent illness with healthy eating, regular exercise, and other steps. Follow-up care is a key part of your treatment and safety. Be sure to make and go to all appointments, and call your doctor if you are having problems. It's also a good idea to know your test results and keep a list of the medicines you take. How can you care for yourself at home? · Get screening tests that you and your doctor decide on. Screening helps find diseases before any symptoms appear. · Eat healthy foods. Choose fruits, vegetables, whole grains, protein, and low-fat dairy foods. Limit fat, especially saturated fat. Reduce salt in your diet. · Limit alcohol. If you are a man, have no more than 2 drinks a day or 14 drinks a week. If you are a woman, have no more than 1 drink a day or 7 drinks a week. · Get at least 30 minutes of physical activity on most days of the week. Walking is a good choice. You also may want to do other activities, such as running, swimming, cycling, or playing tennis or team sports. Discuss any changes in your exercise program with your doctor. · Reach and stay at a healthy weight. This will lower your risk for many problems, such as obesity, diabetes, heart disease, and high blood pressure. · Do not smoke or allow others to smoke around you. If you need help quitting, talk to your doctor about stop-smoking programs and medicines. These can increase your chances of quitting for good. · Care for your mental health. It is easy to get weighed down by worry and stress. Learn strategies to manage stress, like deep breathing and mindfulness, and stay connected with your family and community.  If you find you often feel sad or hopeless, talk with your doctor. Treatment can help. · Talk to your doctor about whether you have any risk factors for sexually transmitted infections (STIs). You can help prevent STIs if you wait to have sex with a new partner (or partners) until you've each been tested for STIs. It also helps if you use condoms (male or female condoms) and if you limit your sex partners to one person who only has sex with you. Vaccines are available for some STIs, such as HPV. · Use birth control if it's important to you to prevent pregnancy. Talk with your doctor about the choices available and what might be best for you. · If you think you may have a problem with alcohol or drug use, talk to your doctor. This includes prescription medicines (such as amphetamines and opioids) and illegal drugs (such as cocaine and methamphetamine). Your doctor can help you figure out what type of treatment is best for you. · Protect your skin from too much sun. When you're outdoors from 10 a.m. to 4 p.m., stay in the shade or cover up with clothing and a hat with a wide brim. Wear sunglasses that block UV rays. Even when it's cloudy, put broad-spectrum sunscreen (SPF 30 or higher) on any exposed skin. · See a dentist one or two times a year for checkups and to have your teeth cleaned. · Wear a seat belt in the car. When should you call for help? Watch closely for changes in your health, and be sure to contact your doctor if you have any problems or symptoms that concern you. Where can you learn more? Go to http://www.[employer].com/  Enter P072 in the search box to learn more about \"Well Visit, Ages 25 to 48: Care Instructions. \"  Current as of: February 11, 2021               Content Version: 13.0  © 0320-2858 Healthwise, Incorporated. Care instructions adapted under license by [employer] (which disclaims liability or warranty for this information).  If you have questions about a medical condition or this instruction, always ask your healthcare professional. [place] [address] any warranty or liability for your use of this information.

## 2021-10-15 NOTE — PROGRESS NOTES
Zheng Roberson is a 37 y.o. female who was seen in clinic today (10/15/2021) for a full physical.       Assessment & Plan:   Below is the assessment and plan developed based on review of pertinent history, physical exam, labs, studies, and medications. 1. Routine physical examination  -     HEPATITIS C AB  -     METABOLIC PANEL, COMPREHENSIVE  -     CBC W/O DIFF  -     LIPID PANEL  -     PAP IG, APTIMA HPV AND RFX 16/18,45 ([id number]); Future  2. Encounter for screening mammogram for malignant neoplasm of breast  -     [facility] 3D ANDRE W MAMMO BI SCREENING INCL CAD; Future  3. Anxiety  Assessment & Plan:  well controlled, she is taking her medication(s) as directed & without any side effects, continue current treatment    4. Closed displaced fracture of proximal phalanx of left thumb with routine healing, subsequent encounter  Comments:  new dx, ortho notes reviewed, will defer to speicalist    Follow-up and Dispositions    · Return in about 1 year (around 10/15/2022). Subjective:   Susan Brown is here today for a full physical.      Since last visit: no changes    Health Maintenance  Immunizations:   Covid: up to date  Influenza: she will plan on getting it this fall   Tetanus: up to date    Cancer screening:   Cervical: reviewed guidelines, will do today  Breast: reviewed guidelines, order placed      The following sections were reviewed & updated as appropriate: Problem List, Allergies, PMH, PSH, FH, and SH. Prior to Admission medications    Medication Sig Start Date End Date Taking? Authorizing Provider   MULTIVITAMIN PO Take  by mouth daily. Yes Provider, Historical   sertraline (ZOLOFT) 100 mg tablet TAKE 1 TABLET BY MOUTH EVERY DAY 8/20/21  Yes Derrick Caldwell MD          Review of Systems   Musculoskeletal: Positive for joint pain (L thumb, fell and broke it last month). All other systems reviewed and are negative.       Objective:   Physical Exam  Constitutional:       General: She is not in acute distress. Appearance: She is well-developed. HENT:      Right Ear: Tympanic membrane and ear canal normal.      Left Ear: Tympanic membrane and ear canal normal.   Eyes:      Conjunctiva/sclera: Conjunctivae normal.   Cardiovascular:      Rate and Rhythm: Regular rhythm. Heart sounds: Normal heart sounds. No murmur heard. Pulmonary:      Effort: Pulmonary effort is normal.      Breath sounds: Normal breath sounds. Abdominal:      General: Bowel sounds are normal.      Palpations: Abdomen is soft. Tenderness: There is no abdominal tenderness. Hernia: There is no hernia in the left inguinal area. Genitourinary:     Labia:         Right: No rash, tenderness, lesion or injury. Left: No rash, tenderness, lesion or injury. Vagina: No foreign body. No vaginal discharge, erythema or tenderness. Cervix: No cervical motion tenderness, discharge or friability. Comments: Chaperone: Cintia Milan    Musculoskeletal:      Right lower leg: No edema. Left lower leg: No edema. Comments: Brace on the L thumb   Lymphadenopathy:      Cervical: No cervical adenopathy.    Psychiatric:         Mood and Affect: Mood and affect normal.          Visit Vitals  /72   Pulse 70   Temp 98.6 °F (37 °C) (Temporal)   Resp 12   Ht 5' 8.25\" (1.734 m)   Wt 127 lb (57.6 kg)   LMP 10/01/2021   SpO2 98%   BMI 19.17 kg/m²          Savi Cardozo MD

## 2021-10-15 NOTE — ASSESSMENT & PLAN NOTE
well controlled, she is taking her medication(s) as directed & without any side effects, continue current treatment

## 2021-10-21 LAB
CYTOLOGIST CVX/VAG CYTO: ABNORMAL
CYTOLOGY CVX/VAG DOC CYTO: ABNORMAL
CYTOLOGY CVX/VAG DOC THIN PREP: ABNORMAL
DX ICD CODE: ABNORMAL
DX ICD CODE: ABNORMAL
HPV I/H RISK 4 DNA CVX QL PROBE+SIG AMP: NEGATIVE
Lab: ABNORMAL
OTHER STN SPEC: ABNORMAL
PATHOLOGIST CVX/VAG CYTO: ABNORMAL
STAT OF ADQ CVX/VAG CYTO-IMP: ABNORMAL

## 2021-10-22 NOTE — PROGRESS NOTES
Results released to patient via [employer]. ASC-US, HPV -, was previously HPV -. Is for progression MARIELLE 3+ is < 1%. Will repeat in 3 yrs.

## 2021-11-01 ENCOUNTER — PATIENT MESSAGE (OUTPATIENT)
Dept: INTERNAL MEDICINE CLINIC | Age: 41
End: 2021-11-01

## 2021-12-17 ENCOUNTER — HOSPITAL ENCOUNTER (OUTPATIENT)
Dept: MAMMOGRAPHY | Age: 41
Discharge: HOME | End: 2021-12-17
Attending: INTERNAL MEDICINE
Payer: COMMERCIAL

## 2021-12-17 DIAGNOSIS — Z12.31 ENCOUNTER FOR SCREENING MAMMOGRAM FOR MALIGNANT NEOPLASM OF BREAST: ICD-10-CM

## 2021-12-17 PROCEDURE — 77063 BREAST TOMOSYNTHESIS BI: CPT

## 2022-02-23 DIAGNOSIS — F41.9 ANXIETY: ICD-10-CM

## 2022-02-23 RX ORDER — SERTRALINE HYDROCHLORIDE 100 MG/1
TABLET, FILM COATED ORAL
Qty: 90 TABLET | Refills: 1 | Status: SHIPPED | OUTPATIENT
Start: 2022-02-23 | End: 2022-08-26

## 2022-08-25 DIAGNOSIS — F41.9 ANXIETY: ICD-10-CM

## 2022-08-26 RX ORDER — SERTRALINE HYDROCHLORIDE 100 MG/1
TABLET, FILM COATED ORAL
Qty: 90 TABLET | Refills: 0 | Status: SHIPPED | OUTPATIENT
Start: 2022-08-26

## 2022-08-26 NOTE — TELEPHONE ENCOUNTER
Future Appointments   Date Time Provider Yehuda Arreguin   10/21/2022  9:30 AM Alexander Ayala MD [facility] [place] [facility]

## 2022-10-21 ENCOUNTER — OFFICE VISIT (OUTPATIENT)
Dept: INTERNAL MEDICINE CLINIC | Age: 42
End: 2022-10-21
Payer: COMMERCIAL

## 2022-10-21 VITALS
HEIGHT: 69 IN | WEIGHT: 135 LBS | SYSTOLIC BLOOD PRESSURE: 113 MMHG | OXYGEN SATURATION: 98 % | BODY MASS INDEX: 19.99 KG/M2 | TEMPERATURE: 98.2 F | HEART RATE: 61 BPM | DIASTOLIC BLOOD PRESSURE: 72 MMHG | RESPIRATION RATE: 18 BRPM

## 2022-10-21 DIAGNOSIS — F41.9 ANXIETY: ICD-10-CM

## 2022-10-21 DIAGNOSIS — Z00.00 ROUTINE PHYSICAL EXAMINATION: Primary | ICD-10-CM

## 2022-10-21 DIAGNOSIS — Z12.31 ENCOUNTER FOR SCREENING MAMMOGRAM FOR MALIGNANT NEOPLASM OF BREAST: ICD-10-CM

## 2022-10-21 PROCEDURE — 99396 PREV VISIT EST AGE 40-64: CPT | Performed by: INTERNAL MEDICINE

## 2022-10-21 NOTE — PROGRESS NOTES
Corina Mccoy is a 40 y.o. female who was seen in clinic today (10/21/2022) for a full physical.       Assessment & Plan:   Below is the assessment and plan developed based on review of pertinent history, physical exam, labs, studies, and medications. 1. Routine physical examination  -     HEPATITIS C AB; Future  -     METABOLIC PANEL, COMPREHENSIVE; Future  -     CBC W/O DIFF; Future  -     LIPID PANEL; Future  2. Anxiety  Assessment & Plan:  well controlled, continue current treatment    3. Encounter for screening mammogram for malignant neoplasm of breast  -     [facility] 3D ANDRE W MAMMO BI SCREENING INCL CAD; Future    Follow-up and Dispositions    Return in about 1 year (around 10/21/2023) for FULL PHYSICAL. Subjective:   Louis Soler is here today for a full physical.      Since last visit: no changes, never had labs done from last year, she is interested in getting labs done. She completed an iron man. Health Maintenance  Immunizations:   Covid: up to date  Influenza: up to date  Tetanus: up to date    Cancer screening:   Cervical: reviewed guidelines & previous PAP, she did f/u with OBGYN, she reports they did \"a test\" and was told everything was okay. Breast: reviewed guidelines, order placed      The following acute and/or chronic problems were addressed today:    Mental Health Review  Patient is seen for anxiety. Most of her stress is work related and is staffing & co-worker dependent. Available GAD7 reviewed. Reports experiences the following side effects from the treatment: none. The following sections were reviewed & updated as appropriate: Problem List, Allergies, PMH, PSH, FH, and SH. Prior to Admission medications    Medication Sig Start Date End Date Taking? Authorizing Provider   sertraline (ZOLOFT) 100 mg tablet TAKE 1 TABLET BY MOUTH EVERY DAY 8/26/22  Yes Kinza Pinto MD   MULTIVITAMIN PO Take  by mouth daily.    Yes Provider, Historical          Review of Systems   Constitutional:  Negative for chills and fever. Respiratory:  Negative for cough and shortness of breath. Cardiovascular:  Negative for chest pain and palpitations. Gastrointestinal:  Negative for abdominal pain, blood in stool, constipation, diarrhea, heartburn, nausea and vomiting. Musculoskeletal:  Negative for joint pain and myalgias. Neurological:  Negative for tingling, focal weakness and headaches. Endo/Heme/Allergies:  Does not bruise/bleed easily. Psychiatric/Behavioral:  Negative for depression. The patient is not nervous/anxious and does not have insomnia. Objective:   Physical Exam  Constitutional:       General: She is not in acute distress. Appearance: She is well-developed. HENT:      Right Ear: Tympanic membrane and ear canal normal.      Left Ear: Tympanic membrane and ear canal normal.   Eyes:      Conjunctiva/sclera: Conjunctivae normal.   Cardiovascular:      Rate and Rhythm: Regular rhythm. Heart sounds: Normal heart sounds. No murmur heard. Pulmonary:      Effort: Pulmonary effort is normal.      Breath sounds: Normal breath sounds. Abdominal:      General: Bowel sounds are normal.      Palpations: Abdomen is soft. There is no hepatomegaly or splenomegaly. Tenderness: There is no abdominal tenderness. Musculoskeletal:      Right lower leg: No edema. Left lower leg: No edema. Lymphadenopathy:      Cervical: No cervical adenopathy.    Psychiatric:         Mood and Affect: Mood and affect normal.        Visit Vitals  /72   Pulse 61   Temp 98.2 °F (36.8 °C) (Temporal)   Resp 18   Ht 5' 8.5\" (1.74 m)   Wt 135 lb (61.2 kg)   LMP 10/03/2022   SpO2 98%   BMI 20.23 kg/m²          Claudio Singh MD

## 2022-10-21 NOTE — PATIENT INSTRUCTIONS
Well Visit, Ages 25 to 72: Care Instructions  Well visits can help you stay healthy. Your doctor has checked your overall health and may have suggested ways to take good care of yourself. Your doctor also may have recommended tests. You can help prevent illness with healthy eating, good sleep, vaccinations, regular exercise, and other steps. Get the tests that you and your doctor decide on. Depending on your age and risks, examples might include screening for diabetes; hepatitis C; HIV; and cervical, breast, lung, and colon cancer. Screening helps find diseases before any symptoms appear. Eat healthy foods. Choose fruits, vegetables, whole grains, lean protein, and low-fat dairy foods. Limit saturated fat and reduce salt. Limit alcohol. Men should have no more than 2 drinks a day. Women should have no more than 1. For some people, no alcohol is the best choice. Exercise. Get at least 30 minutes of exercise on most days of the week. Walking can be a good choice. Reach and stay at your healthy weight. This will lower your risk for many health problems. Take care of your mental health. Try to stay connected with friends, family, and community, and find ways to manage stress. If you're feeling depressed or hopeless, talk to someone. A counselor can help. If you don't have a counselor, talk to your doctor. Talk to your doctor if you think you may have a problem with alcohol or drug use. This includes prescription medicines and illegal drugs. Avoid tobacco and nicotine: Don't smoke, vape, or chew. If you need help quitting, talk to your doctor. Practice safer sex. Getting tested, using condoms or dental dams, and limiting sex partners can help prevent STIs. Use birth control if it's important to you to prevent pregnancy. Talk with your doctor about your choices and what might be best for you. Prevent problems where you can.  Protect your skin from too much sun, wash your hands, brush your teeth twice a day, and wear a seat belt in the car. Where can you learn more? Go to http://www.[employer].com/  Enter P072 in the search box to learn more about \"Well Visit, Ages 25 to 72: Care Instructions. \"  Current as of: March 9, 2022               Content Version: 13.4  © 8438-0372 Healthwise, Incorporated. Care instructions adapted under license by [employer] (which disclaims liability or warranty for this information). If you have questions about a medical condition or this instruction, always ask your healthcare professional. [place] [address] any warranty or liability for your use of this information.

## 2022-11-22 DIAGNOSIS — F41.9 ANXIETY: ICD-10-CM

## 2022-11-22 RX ORDER — SERTRALINE HYDROCHLORIDE 100 MG/1
TABLET, FILM COATED ORAL
Qty: 90 TABLET | Refills: 3 | Status: SHIPPED | OUTPATIENT
Start: 2022-11-22

## 2022-11-22 NOTE — TELEPHONE ENCOUNTER
Future Appointments   Date Time Provider Yehuda Arreguin   10/27/2023  9:40 AM Madeleine Mendez MD [facility]

## 2022-12-22 ENCOUNTER — PATIENT MESSAGE (OUTPATIENT)
Dept: INTERNAL MEDICINE CLINIC | Age: 42
End: 2022-12-22

## 2023-03-17 ENCOUNTER — HOSPITAL ENCOUNTER (OUTPATIENT)
Dept: MAMMOGRAPHY | Age: 43
Discharge: HOME | End: 2023-03-17
Attending: INTERNAL MEDICINE
Payer: COMMERCIAL

## 2023-03-17 DIAGNOSIS — Z12.31 ENCOUNTER FOR SCREENING MAMMOGRAM FOR MALIGNANT NEOPLASM OF BREAST: ICD-10-CM

## 2023-03-17 PROCEDURE — 77063 BREAST TOMOSYNTHESIS BI: CPT

## 2023-10-27 ENCOUNTER — OFFICE VISIT (OUTPATIENT)
Age: 43
End: 2023-10-27
Payer: COMMERCIAL

## 2023-10-27 VITALS
BODY MASS INDEX: 20.22 KG/M2 | TEMPERATURE: 97.5 F | OXYGEN SATURATION: 100 % | WEIGHT: 133.4 LBS | DIASTOLIC BLOOD PRESSURE: 75 MMHG | SYSTOLIC BLOOD PRESSURE: 120 MMHG | HEIGHT: 68 IN | HEART RATE: 58 BPM | RESPIRATION RATE: 16 BRPM

## 2023-10-27 DIAGNOSIS — Z23 ENCOUNTER FOR IMMUNIZATION: ICD-10-CM

## 2023-10-27 DIAGNOSIS — M79.644 PAIN OF FINGER OF RIGHT HAND: ICD-10-CM

## 2023-10-27 DIAGNOSIS — Z00.00 ROUTINE PHYSICAL EXAMINATION: Primary | ICD-10-CM

## 2023-10-27 DIAGNOSIS — F41.9 ANXIETY: ICD-10-CM

## 2023-10-27 DIAGNOSIS — K62.3 PARTIAL RECTAL PROLAPSE: ICD-10-CM

## 2023-10-27 PROCEDURE — 90674 CCIIV4 VAC NO PRSV 0.5 ML IM: CPT | Performed by: INTERNAL MEDICINE

## 2023-10-27 PROCEDURE — G8482 FLU IMMUNIZE ORDER/ADMIN: HCPCS | Performed by: INTERNAL MEDICINE

## 2023-10-27 PROCEDURE — 90471 IMMUNIZATION ADMIN: CPT | Performed by: INTERNAL MEDICINE

## 2023-10-27 PROCEDURE — 99396 PREV VISIT EST AGE 40-64: CPT | Performed by: INTERNAL MEDICINE

## 2023-10-27 SDOH — ECONOMIC STABILITY: INCOME INSECURITY: HOW HARD IS IT FOR YOU TO PAY FOR THE VERY BASICS LIKE FOOD, HOUSING, MEDICAL CARE, AND HEATING?: NOT HARD AT ALL

## 2023-10-27 SDOH — ECONOMIC STABILITY: FOOD INSECURITY: WITHIN THE PAST 12 MONTHS, THE FOOD YOU BOUGHT JUST DIDN'T LAST AND YOU DIDN'T HAVE MONEY TO GET MORE.: NEVER TRUE

## 2023-10-27 SDOH — ECONOMIC STABILITY: HOUSING INSECURITY
IN THE LAST 12 MONTHS, WAS THERE A TIME WHEN YOU DID NOT HAVE A STEADY PLACE TO SLEEP OR SLEPT IN A SHELTER (INCLUDING NOW)?: NO

## 2023-10-27 SDOH — ECONOMIC STABILITY: FOOD INSECURITY: WITHIN THE PAST 12 MONTHS, YOU WORRIED THAT YOUR FOOD WOULD RUN OUT BEFORE YOU GOT MONEY TO BUY MORE.: NEVER TRUE

## 2023-10-27 ASSESSMENT — ANXIETY QUESTIONNAIRES
6. BECOMING EASILY ANNOYED OR IRRITABLE: 1
5. BEING SO RESTLESS THAT IT IS HARD TO SIT STILL: 2
1. FEELING NERVOUS, ANXIOUS, OR ON EDGE: 0
7. FEELING AFRAID AS IF SOMETHING AWFUL MIGHT HAPPEN: 1
3. WORRYING TOO MUCH ABOUT DIFFERENT THINGS: 0
GAD7 TOTAL SCORE: 5
2. NOT BEING ABLE TO STOP OR CONTROL WORRYING: 0
4. TROUBLE RELAXING: 1
IF YOU CHECKED OFF ANY PROBLEMS ON THIS QUESTIONNAIRE, HOW DIFFICULT HAVE THESE PROBLEMS MADE IT FOR YOU TO DO YOUR WORK, TAKE CARE OF THINGS AT HOME, OR GET ALONG WITH OTHER PEOPLE: NOT DIFFICULT AT ALL

## 2023-10-27 ASSESSMENT — ENCOUNTER SYMPTOMS
CONSTIPATION: 0
SHORTNESS OF BREATH: 0
ABDOMINAL PAIN: 0
BLOOD IN STOOL: 0
NAUSEA: 0
DIARRHEA: 0
VOMITING: 0
COUGH: 0

## 2023-10-27 ASSESSMENT — PATIENT HEALTH QUESTIONNAIRE - PHQ9
SUM OF ALL RESPONSES TO PHQ9 QUESTIONS 1 & 2: 0
2. FEELING DOWN, DEPRESSED OR HOPELESS: 0
1. LITTLE INTEREST OR PLEASURE IN DOING THINGS: 0
SUM OF ALL RESPONSES TO PHQ QUESTIONS 1-9: 0

## 2023-10-27 ASSESSMENT — LIFESTYLE VARIABLES
HOW OFTEN DO YOU HAVE A DRINK CONTAINING ALCOHOL: 2-3 TIMES A WEEK
HOW MANY STANDARD DRINKS CONTAINING ALCOHOL DO YOU HAVE ON A TYPICAL DAY: 1 OR 2

## 2023-10-27 NOTE — PROGRESS NOTES
Angel Farmer is a 44 y.o. female who was seen in clinic today (10/27/2023) for a full physical.        Assessment & Plan:   Below is the assessment and plan developed based on review of pertinent history, physical exam, labs, studies, and medications. 1. Routine physical examination  -     Comprehensive Metabolic Panel; Future  -     CBC; Future  -     Lipid Panel; Future  -     HIV 1/2 Ag/Ab, 4TH Generation,W Rflx Confirm; Future  -     Hepatitis C Ab, Rflx to Qt by PCR; Future  2. Encounter for immunization  -     Influenza, FLUCELVAX, (age 10 mo+), IM, PF, 0.5 mL  3. Anxiety  Assessment & Plan:  stable and well controlled. I reviewed treatment options with her, I reviewed life style changes to help improve mood, continue current treatment. Did review that since she is no longer working and that was the biggest source of stress I would recommend trying to reduce her zoloft to 50mg and she how she does. She can consider reducing it again to 25mg if doing well. The goal is to get her on the lowest dose of medication that controls her symptoms  4. Pain of finger of right hand  Comments:  new dx, differential reviewed, unclear etiology. Will start w/ robinson tape & rest. Reviewed when to consider imaging. Red flags & expectations reviewed  5. Partial rectal prolapse  Assessment & Plan:  Chronic issue, it is intermittent, currently asymptomatic. Encouraged her to f/u with colo-rectal surgeon to discuss treatment options & expectations. Did review avoiding risk factors (constipation and heavy lifting). Return in about 1 year (around 10/27/2024) for FULL PHYSICAL. Subjective:   Marito Zaldivar is here today for a full physical.  She had labs ordered in '21 and '22, but never completed.   Since last visit: weight is stable    Health Maintenance  Immunizations:   Covid: guidelines reviewed - will consider  Influenza: will do today   Tetanus: up to date    Cancer screening:   Cervical: reviewed guidelines, up to

## 2023-10-27 NOTE — ASSESSMENT & PLAN NOTE
stable and well controlled. I reviewed treatment options with her, I reviewed life style changes to help improve mood, continue current treatment. Did review that since she is no longer working and that was the biggest source of stress I would recommend trying to reduce her zoloft to 50mg and she how she does. She can consider reducing it again to 25mg if doing well.   The goal is to get her on the lowest dose of medication that controls her symptoms

## 2023-10-27 NOTE — ASSESSMENT & PLAN NOTE
Chronic issue, it is intermittent, currently asymptomatic. Encouraged her to f/u with colo-rectal surgeon to discuss treatment options & expectations. Did review avoiding risk factors (constipation and heavy lifting).

## 2023-11-13 DIAGNOSIS — Z00.00 ROUTINE PHYSICAL EXAMINATION: ICD-10-CM

## 2023-11-13 LAB
ALBUMIN SERPL-MCNC: 3.7 G/DL (ref 3.5–5)
ALBUMIN/GLOB SERPL: 1.2 (ref 1.1–2.2)
ALP SERPL-CCNC: 44 U/L (ref 45–117)
ALT SERPL-CCNC: 23 U/L (ref 12–78)
ANION GAP SERPL CALC-SCNC: 1 MMOL/L (ref 5–15)
AST SERPL-CCNC: 12 U/L (ref 15–37)
BILIRUB SERPL-MCNC: 0.5 MG/DL (ref 0.2–1)
BUN SERPL-MCNC: 12 MG/DL (ref 6–20)
BUN/CREAT SERPL: 16 (ref 12–20)
CALCIUM SERPL-MCNC: 8.4 MG/DL (ref 8.5–10.1)
CHLORIDE SERPL-SCNC: 104 MMOL/L (ref 97–108)
CHOLEST SERPL-MCNC: 203 MG/DL
CO2 SERPL-SCNC: 32 MMOL/L (ref 21–32)
CREAT SERPL-MCNC: 0.74 MG/DL (ref 0.55–1.02)
ERYTHROCYTE [DISTWIDTH] IN BLOOD BY AUTOMATED COUNT: 11.9 % (ref 11.5–14.5)
GLOBULIN SER CALC-MCNC: 3.2 G/DL (ref 2–4)
GLUCOSE SERPL-MCNC: 87 MG/DL (ref 65–100)
HCT VFR BLD AUTO: 39.8 % (ref 35–47)
HDLC SERPL-MCNC: 79 MG/DL
HDLC SERPL: 2.6 (ref 0–5)
HGB BLD-MCNC: 13.2 G/DL (ref 11.5–16)
HIV 1+2 AB+HIV1 P24 AG SERPL QL IA: NONREACTIVE
HIV 1/2 RESULT COMMENT: NORMAL
LDLC SERPL CALC-MCNC: 105.8 MG/DL (ref 0–100)
MCH RBC QN AUTO: 31.7 PG (ref 26–34)
MCHC RBC AUTO-ENTMCNC: 33.2 G/DL (ref 30–36.5)
MCV RBC AUTO: 95.7 FL (ref 80–99)
NRBC # BLD: 0 K/UL (ref 0–0.01)
NRBC BLD-RTO: 0 PER 100 WBC
PLATELET # BLD AUTO: 188 K/UL (ref 150–400)
PMV BLD AUTO: 10.6 FL (ref 8.9–12.9)
POTASSIUM SERPL-SCNC: 4 MMOL/L (ref 3.5–5.1)
PROT SERPL-MCNC: 6.9 G/DL (ref 6.4–8.2)
RBC # BLD AUTO: 4.16 M/UL (ref 3.8–5.2)
SODIUM SERPL-SCNC: 137 MMOL/L (ref 136–145)
TRIGL SERPL-MCNC: 91 MG/DL
VLDLC SERPL CALC-MCNC: 18.2 MG/DL
WBC # BLD AUTO: 3.5 K/UL (ref 3.6–11)

## 2023-11-14 LAB
HCV AB SERPL QL IA: NORMAL
HCV IGG SERPL QL IA: NON REACTIVE S/CO RATIO

## 2023-11-14 NOTE — RESULT ENCOUNTER NOTE
Results released to patient via [employer].  All labs are stable or at goal for her except for barely low Ca and WBC.  The 10-year ASCVD risk score (Nick AGRAWAL, et al., 2019) is: 0.3%

## 2023-11-20 DIAGNOSIS — F41.9 ANXIETY DISORDER, UNSPECIFIED: ICD-10-CM

## 2023-11-20 RX ORDER — SERTRALINE HYDROCHLORIDE 100 MG/1
TABLET, FILM COATED ORAL
Qty: 90 TABLET | Refills: 3 | Status: SHIPPED | OUTPATIENT
Start: 2023-11-20

## 2023-11-21 NOTE — TELEPHONE ENCOUNTER
Future Appointments   Date Time Provider [address]   10/29/2024  8:20 AM Guzman Cantu MD [facility] [place] AM[facility]

## 2024-07-23 ENCOUNTER — TRANSCRIBE ORDERS (OUTPATIENT)
Facility: HOSPITAL | Age: 44
End: 2024-07-23

## 2024-07-23 DIAGNOSIS — Z12.31 SCREENING MAMMOGRAM FOR BREAST CANCER: Primary | ICD-10-CM

## 2024-08-21 ENCOUNTER — HOSPITAL ENCOUNTER (OUTPATIENT)
Facility: HOSPITAL | Age: 44
Discharge: HOME | End: 2024-08-24
Attending: INTERNAL MEDICINE
Payer: COMMERCIAL

## 2024-08-21 DIAGNOSIS — Z12.31 SCREENING MAMMOGRAM FOR BREAST CANCER: ICD-10-CM

## 2024-08-21 PROCEDURE — 77063 BREAST TOMOSYNTHESIS BI: CPT

## 2024-09-30 LAB — HBA1C MFR BLD HPLC: 5.5 %

## 2024-10-29 ENCOUNTER — OFFICE VISIT (OUTPATIENT)
Age: 44
End: 2024-10-29

## 2024-10-29 VITALS
OXYGEN SATURATION: 99 % | WEIGHT: 135.6 LBS | RESPIRATION RATE: 16 BRPM | TEMPERATURE: 97.7 F | DIASTOLIC BLOOD PRESSURE: 72 MMHG | BODY MASS INDEX: 20.55 KG/M2 | SYSTOLIC BLOOD PRESSURE: 122 MMHG | HEART RATE: 55 BPM | HEIGHT: 68 IN

## 2024-10-29 DIAGNOSIS — F41.9 ANXIETY: ICD-10-CM

## 2024-10-29 DIAGNOSIS — Z00.00 ROUTINE PHYSICAL EXAMINATION: Primary | ICD-10-CM

## 2024-10-29 DIAGNOSIS — Z23 ENCOUNTER FOR IMMUNIZATION: ICD-10-CM

## 2024-10-29 RX ORDER — SERTRALINE HYDROCHLORIDE 25 MG/1
25 TABLET, FILM COATED ORAL DAILY
Qty: 90 TABLET | Refills: 0 | Status: SHIPPED | OUTPATIENT
Start: 2024-10-29

## 2024-10-29 SDOH — ECONOMIC STABILITY: FOOD INSECURITY: WITHIN THE PAST 12 MONTHS, THE FOOD YOU BOUGHT JUST DIDN'T LAST AND YOU DIDN'T HAVE MONEY TO GET MORE.: NEVER TRUE

## 2024-10-29 SDOH — ECONOMIC STABILITY: INCOME INSECURITY: HOW HARD IS IT FOR YOU TO PAY FOR THE VERY BASICS LIKE FOOD, HOUSING, MEDICAL CARE, AND HEATING?: NOT HARD AT ALL

## 2024-10-29 SDOH — ECONOMIC STABILITY: FOOD INSECURITY: WITHIN THE PAST 12 MONTHS, YOU WORRIED THAT YOUR FOOD WOULD RUN OUT BEFORE YOU GOT MONEY TO BUY MORE.: NEVER TRUE

## 2024-10-29 ASSESSMENT — ENCOUNTER SYMPTOMS
NAUSEA: 0
BLOOD IN STOOL: 0
CONSTIPATION: 0
COUGH: 0
SHORTNESS OF BREATH: 0
ABDOMINAL PAIN: 0
DIARRHEA: 0
VOMITING: 0

## 2024-10-29 ASSESSMENT — PATIENT HEALTH QUESTIONNAIRE - PHQ9
SUM OF ALL RESPONSES TO PHQ QUESTIONS 1-9: 0
SUM OF ALL RESPONSES TO PHQ QUESTIONS 1-9: 0
1. LITTLE INTEREST OR PLEASURE IN DOING THINGS: NOT AT ALL
2. FEELING DOWN, DEPRESSED OR HOPELESS: NOT AT ALL
SUM OF ALL RESPONSES TO PHQ QUESTIONS 1-9: 0
SUM OF ALL RESPONSES TO PHQ9 QUESTIONS 1 & 2: 0
SUM OF ALL RESPONSES TO PHQ QUESTIONS 1-9: 0

## 2024-10-29 NOTE — ASSESSMENT & PLAN NOTE
stable and well controlled, I reviewed treatment options with her, I reviewed life style changes to help improve mood, following changes were made today: will try and reduce her dose from 50mg to 25mg and reassess.

## 2024-10-29 NOTE — PROGRESS NOTES
Henrietta Najera is a 44 y.o. female who was seen in clinic today (10/29/2024) for a full physical.        Assessment & Plan:   Below is the assessment and plan developed based on review of pertinent history, physical exam, labs, studies, and medications.    1. Routine physical examination  Comments:  Labs from 9/30/24 reviewed and discussed with her.  She will consider starting a MVI  2. Encounter for immunization  -     Influenza, FLUCELVAX Trivalent, (age 6 mo+) IM, Preservative Free, 0.5mL  3. Anxiety  Assessment & Plan:  stable and well controlled, I reviewed treatment options with her, I reviewed life style changes to help improve mood, following changes were made today: will try and reduce her dose from 50mg to 25mg and reassess.     Orders:  -     sertraline (ZOLOFT) 25 MG tablet; Take 1 tablet by mouth daily, Disp-90 tablet, R-0Normal     Return in 1 year (on 10/29/2025) for CPE (Physical Exam).   Subjective:   Henrietta is here today for a full physical.      Since last visit: weight is stable.  She just returned from seeing Ailin Cevallos in [facility] last night.    Health Maintenance  Immunizations:   Covid: declined  Influenza: will do today   Tetanus: up to date    Cancer screening:   Cervical: reviewed guidelines, she will schedule with OBGYN.  Breast: reviewed guidelines, up to date.     The following acute and/or chronic problems were addressed today:     Mental Health Review  Patient is seen for anxiety.  Since last visit she has been taking 1/2 tab.  Available GAD7 reviewed.  Reports experiences the following side effects from the treatment: none.      The following sections were reviewed & updated as appropriate: Problem List, Allergies, PMH, PSH, FH, and SH.    Prior to Admission medications    Medication Sig Start Date End Date Taking? Authorizing Provider   sertraline (ZOLOFT) 100 MG tablet TAKE 1 TABLET BY MOUTH EVERY DAY  Patient taking differently: Take 0.5 tablets by mouth daily 11/20/23  Yes

## 2025-02-04 DIAGNOSIS — F41.9 ANXIETY: ICD-10-CM

## 2025-02-04 RX ORDER — SERTRALINE HYDROCHLORIDE 25 MG/1
25 TABLET, FILM COATED ORAL DAILY
Qty: 90 TABLET | Refills: 0 | Status: SHIPPED | OUTPATIENT
Start: 2025-02-04

## 2025-05-02 DIAGNOSIS — F41.9 ANXIETY: ICD-10-CM

## 2025-05-02 RX ORDER — SERTRALINE HYDROCHLORIDE 25 MG/1
25 TABLET, FILM COATED ORAL DAILY
Qty: 90 TABLET | Refills: 0 | Status: SHIPPED | OUTPATIENT
Start: 2025-05-02

## 2025-05-06 ENCOUNTER — TELEPHONE (OUTPATIENT)
Age: 45
End: 2025-05-06

## 2025-05-06 ENCOUNTER — HOSPITAL ENCOUNTER (EMERGENCY)
Facility: HOSPITAL | Age: 45
Discharge: HOME | End: 2025-05-06
Attending: STUDENT IN AN ORGANIZED HEALTH CARE EDUCATION/TRAINING PROGRAM
Payer: COMMERCIAL

## 2025-05-06 ENCOUNTER — APPOINTMENT (OUTPATIENT)
Facility: HOSPITAL | Age: 45
End: 2025-05-06
Payer: COMMERCIAL

## 2025-05-06 VITALS
SYSTOLIC BLOOD PRESSURE: 128 MMHG | HEART RATE: 72 BPM | OXYGEN SATURATION: 98 % | TEMPERATURE: 98.8 F | WEIGHT: 140.87 LBS | BODY MASS INDEX: 21.42 KG/M2 | RESPIRATION RATE: 16 BRPM | DIASTOLIC BLOOD PRESSURE: 83 MMHG

## 2025-05-06 DIAGNOSIS — R00.2 PALPITATIONS: ICD-10-CM

## 2025-05-06 DIAGNOSIS — F41.1 ANXIETY STATE: Primary | ICD-10-CM

## 2025-05-06 LAB
ALBUMIN SERPL-MCNC: 3.8 G/DL (ref 3.5–5)
ALBUMIN/GLOB SERPL: 1 (ref 1.1–2.2)
ALP SERPL-CCNC: 76 U/L (ref 45–117)
ALT SERPL-CCNC: 32 U/L (ref 12–78)
ANION GAP SERPL CALC-SCNC: 5 MMOL/L (ref 2–12)
AST SERPL-CCNC: 17 U/L (ref 15–37)
BASOPHILS # BLD: 0.02 K/UL (ref 0–0.1)
BASOPHILS NFR BLD: 0.3 % (ref 0–1)
BILIRUB SERPL-MCNC: 0.3 MG/DL (ref 0.2–1)
BUN SERPL-MCNC: 13 MG/DL (ref 6–20)
BUN/CREAT SERPL: 16 (ref 12–20)
CALCIUM SERPL-MCNC: 9.4 MG/DL (ref 8.5–10.1)
CHLORIDE SERPL-SCNC: 104 MMOL/L (ref 97–108)
CO2 SERPL-SCNC: 28 MMOL/L (ref 21–32)
CREAT SERPL-MCNC: 0.8 MG/DL (ref 0.55–1.02)
DIFFERENTIAL METHOD BLD: ABNORMAL
EKG ATRIAL RATE: 80 BPM
EKG DIAGNOSIS: NORMAL
EKG P AXIS: 80 DEGREES
EKG P-R INTERVAL: 124 MS
EKG Q-T INTERVAL: 392 MS
EKG QRS DURATION: 74 MS
EKG QTC CALCULATION (BAZETT): 452 MS
EKG R AXIS: 88 DEGREES
EKG T AXIS: 50 DEGREES
EKG VENTRICULAR RATE: 80 BPM
EOSINOPHIL # BLD: 0.02 K/UL (ref 0–0.4)
EOSINOPHIL NFR BLD: 0.3 % (ref 0–7)
ERYTHROCYTE [DISTWIDTH] IN BLOOD BY AUTOMATED COUNT: 11.7 % (ref 11.5–14.5)
GLOBULIN SER CALC-MCNC: 3.7 G/DL (ref 2–4)
GLUCOSE SERPL-MCNC: 129 MG/DL (ref 65–100)
HCT VFR BLD AUTO: 43.1 % (ref 35–47)
HGB BLD-MCNC: 15.1 G/DL (ref 11.5–16)
IMM GRANULOCYTES # BLD AUTO: 0.01 K/UL (ref 0–0.04)
IMM GRANULOCYTES NFR BLD AUTO: 0.1 % (ref 0–0.5)
LYMPHOCYTES # BLD: 1.3 K/UL (ref 0.8–3.5)
LYMPHOCYTES NFR BLD: 19 % (ref 12–49)
MCH RBC QN AUTO: 31.9 PG (ref 26–34)
MCHC RBC AUTO-ENTMCNC: 35 G/DL (ref 30–36.5)
MCV RBC AUTO: 91.1 FL (ref 80–99)
MONOCYTES # BLD: 0.34 K/UL (ref 0–1)
MONOCYTES NFR BLD: 5 % (ref 5–13)
NEUTS SEG # BLD: 5.14 K/UL (ref 1.8–8)
NEUTS SEG NFR BLD: 75.3 % (ref 32–75)
NRBC # BLD: 0 K/UL (ref 0–0.01)
NRBC BLD-RTO: 0 PER 100 WBC
PLATELET # BLD AUTO: 226 K/UL (ref 150–400)
PMV BLD AUTO: 10.5 FL (ref 8.9–12.9)
POTASSIUM SERPL-SCNC: 3.7 MMOL/L (ref 3.5–5.1)
PROT SERPL-MCNC: 7.5 G/DL (ref 6.4–8.2)
RBC # BLD AUTO: 4.73 M/UL (ref 3.8–5.2)
SODIUM SERPL-SCNC: 137 MMOL/L (ref 136–145)
TROPONIN I SERPL HS-MCNC: 4 NG/L (ref 0–51)
WBC # BLD AUTO: 6.8 K/UL (ref 3.6–11)

## 2025-05-06 PROCEDURE — 90792 PSYCH DIAG EVAL W/MED SRVCS: CPT

## 2025-05-06 PROCEDURE — 71046 X-RAY EXAM CHEST 2 VIEWS: CPT

## 2025-05-06 PROCEDURE — 85025 COMPLETE CBC W/AUTO DIFF WBC: CPT

## 2025-05-06 PROCEDURE — 80053 COMPREHEN METABOLIC PANEL: CPT

## 2025-05-06 PROCEDURE — 84484 ASSAY OF TROPONIN QUANT: CPT

## 2025-05-06 PROCEDURE — 99285 EMERGENCY DEPT VISIT HI MDM: CPT

## 2025-05-06 PROCEDURE — 93005 ELECTROCARDIOGRAM TRACING: CPT | Performed by: STUDENT IN AN ORGANIZED HEALTH CARE EDUCATION/TRAINING PROGRAM

## 2025-05-06 PROCEDURE — 6370000000 HC RX 637 (ALT 250 FOR IP): Performed by: STUDENT IN AN ORGANIZED HEALTH CARE EDUCATION/TRAINING PROGRAM

## 2025-05-06 RX ORDER — HYDROXYZINE HYDROCHLORIDE 25 MG/1
25 TABLET, FILM COATED ORAL EVERY 8 HOURS PRN
Qty: 30 TABLET | Refills: 0 | Status: SHIPPED | OUTPATIENT
Start: 2025-05-06 | End: 2025-05-09

## 2025-05-06 RX ORDER — HYDROXYZINE HYDROCHLORIDE 25 MG/1
25 TABLET, FILM COATED ORAL
Status: COMPLETED | OUTPATIENT
Start: 2025-05-06 | End: 2025-05-06

## 2025-05-06 RX ADMIN — HYDROXYZINE HYDROCHLORIDE 25 MG: 25 TABLET, FILM COATED ORAL at 09:30

## 2025-05-06 ASSESSMENT — PAIN - FUNCTIONAL ASSESSMENT
PAIN_FUNCTIONAL_ASSESSMENT: NONE - DENIES PAIN
PAIN_FUNCTIONAL_ASSESSMENT: NONE - DENIES PAIN

## 2025-05-06 ASSESSMENT — ENCOUNTER SYMPTOMS
EYE PAIN: 0
SHORTNESS OF BREATH: 0
SORE THROAT: 0
DIARRHEA: 0
COUGH: 0
NAUSEA: 0
VOMITING: 0
ABDOMINAL PAIN: 0

## 2025-05-06 NOTE — ED TRIAGE NOTES
Pt c/o worsening anxiety and depression , denies SI, denies HI, denies having any alcohol , drugs or weapons on her, denies auditory or visual hallucinations, + heart pounding, sob, difficulty sleeping

## 2025-05-06 NOTE — TELEPHONE ENCOUNTER
Called pt ID x 2. Pt states this has been going for mor than a week. Has been having trouble sleeping, feels her heart pounding, feels she can't breath, and has been crying a lot. States her sertraline has not been helping had decreased from 50 mg to 25 mg per [facility]. At first she felt she was doing well cutting down. Last night she took a 50 mg tab of sertraline to see if it would help. States she felt her symptoms worsening, even took 100 mg which did not help. States nothing recently has happened in her life to start feeling like this. Declines suicidal thoughts. Has considered going to ER. Agreed with pt she should go to ER since she is experiencing SOB, so they can do a full eval. Can f/up with [facility] after going to ER for next steps. Pt understanding verbalized.

## 2025-05-06 NOTE — TELEPHONE ENCOUNTER
ER records reviewed, worsening anxiety.  I have one opening tomorrow.  I am not in the office Thurs/Fri. I  have openings on Monday and Tuesday of next week.    Please call her to schedule ER follow up.

## 2025-05-06 NOTE — ED PROVIDER NOTES
Abnormal; Notable for the following components:    Glucose 129 (*)     Albumin/Globulin Ratio 1.0 (*)     All other components within normal limits   TROPONIN   EXTRA TUBES HOLD       All other labs were within normal range or not returned as of this dictation.    EMERGENCY DEPARTMENT COURSE and DIFFERENTIAL DIAGNOSIS/MDM:   Vitals:    Vitals:    05/06/25 0846   BP: (!) 142/84   Pulse: 87   Resp: 20   Temp: 98.4 °F (36.9 °C)   TempSrc: Oral   SpO2: 96%   Weight: 63.9 kg (140 lb 14 oz)           Medical Decision Making  44-year-old female presents ED with mental health concerns.  Vital signs stable in triage with patient afebrile.  Heart rate at 87 bpm and oxygen saturation 96% on room air.  Physical exam notable for anxious mood and tearful affect but otherwise heart sounds normal and lungs clear to auscultation bilaterally.  Patient endorses concern for underlying medical etiology of her symptoms aside from psychiatric concerns.  See below for EKG interpretation.  Labs unremarkable troponin at 4, no elevation white blood cell count or major electrolyte abnormalities.  Chest x-ray showed no acute process.  No evidence of underlying medical concern to cause patient's symptoms at this time.    Patient evaluated by telepsych as outlined below who felt that she did not meet criteria for admission but recommended outpatient resources with psychotherapy and perhaps outpatient prescription for hydroxyzine.  Patient received dose of hydroxyzine with improvement of symptoms.    Results and findings have been communicated and explained thoroughly to patient and family members. Patient has been educated on strict return precautions as well as instructions for conservative care and follow-up. Patient will be discharged with prescription for hydroxyzine and has been given opportunity to ask questions about this new medication.Patient verbalizes understanding and no socioeconomic barriers to care were identified during this

## 2025-05-06 NOTE — TELEPHONE ENCOUNTER
Attempted to call pt. Unable to lvm. No option given. Will send pt a MCM and will retry calling back later.

## 2025-05-06 NOTE — TELEPHONE ENCOUNTER
Reason for call:  suffering from depression and anxiety, not sure what she should do.  Please call    Is this a new problem: Yes    Date of last appointment:  10/29/2024     Can we respond via [employer]: No    Best call back number:     Henrietta Najera (Self) [phone] (Mobile)

## 2025-05-06 NOTE — VIRTUAL HEALTH
[facility] Consult to Tele-Psych  Consult performed by: Swati Clark, JACQUELINE - CNP  Consult ordered by: Kelley Peoples PA  Reason for consult: Other: Anxiety    Henrietta Najera  [id number]  1980     EMERGENCY DEPARTMENT TELEPSYCHIATRY EVALUATION    05/06/25    Chief Complaint:  “Anxiety ”    HPI: Patient is a 44 y.o.  female who presents for psychiatric evaluation. Patient presented to the ED on 05/06/25 from home. History from the ED: \"44 y.o. female presents to ED with anxiety and depression. Patient reports that for the past week she has had progressively worsening anxiety. She takes sertraline regularly, as prescribed by her PCP. She reports that last night her heart was pounding and she was feeling SOB. She reached out to her PCP who recommended she come in to be evaluated. 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 She notes these symptoms have continued. Denies SI, HI. Denies any tobacco, occasional EtOH, no drug use.\" Upon assessment today the patient is alert, oriented, and agreeable to participate in assessment. Patient is seated in bed, calm, and cooperative. Patient reports history of anxiety with recent exacerbation of symptoms the past 2-3 days. Endorses increased worrying, restlessness, trouble relaxing, and physical symptoms including heart racing, SOB, and feeling like her throat is closing. Patient states appetite and sleep have been decreased the past few days. Patient also reports anhedonia, feeling helpless, fatigue, and low energy. Patient states she has been worrying about her symptoms and trying to stay healthy. Patient denies passive or active SI/plan/intent. No history of suicide attempts or SIB. Denies HI and AVH. No paranoia or delusions verbalized. Patient denies any recent stressors or triggers other than being out of town for a recent swim meet. Denies recent alcohol or drug use. Patient is not currently

## 2025-05-08 ENCOUNTER — TELEMEDICINE (OUTPATIENT)
Age: 45
End: 2025-05-08
Payer: COMMERCIAL

## 2025-05-08 DIAGNOSIS — F41.1 GENERALIZED ANXIETY DISORDER: Primary | ICD-10-CM

## 2025-05-08 PROCEDURE — 99213 OFFICE O/P EST LOW 20 MIN: CPT | Performed by: NURSE PRACTITIONER

## 2025-05-08 RX ORDER — BUSPIRONE HYDROCHLORIDE 5 MG/1
5 TABLET ORAL 2 TIMES DAILY
Qty: 60 TABLET | Refills: 0 | Status: SHIPPED | OUTPATIENT
Start: 2025-05-08 | End: 2025-06-07

## 2025-05-08 ASSESSMENT — PATIENT HEALTH QUESTIONNAIRE - PHQ9
9. THOUGHTS THAT YOU WOULD BE BETTER OFF DEAD, OR OF HURTING YOURSELF: NOT AT ALL
1. LITTLE INTEREST OR PLEASURE IN DOING THINGS: MORE THAN HALF THE DAYS
SUM OF ALL RESPONSES TO PHQ QUESTIONS 1-9: 15
SUM OF ALL RESPONSES TO PHQ QUESTIONS 1-9: 15
6. FEELING BAD ABOUT YOURSELF - OR THAT YOU ARE A FAILURE OR HAVE LET YOURSELF OR YOUR FAMILY DOWN: NEARLY EVERY DAY
SUM OF ALL RESPONSES TO PHQ QUESTIONS 1-9: 15
3. TROUBLE FALLING OR STAYING ASLEEP: MORE THAN HALF THE DAYS
SUM OF ALL RESPONSES TO PHQ QUESTIONS 1-9: 15
8. MOVING OR SPEAKING SO SLOWLY THAT OTHER PEOPLE COULD HAVE NOTICED. OR THE OPPOSITE, BEING SO FIGETY OR RESTLESS THAT YOU HAVE BEEN MOVING AROUND A LOT MORE THAN USUAL: NOT AT ALL
7. TROUBLE CONCENTRATING ON THINGS, SUCH AS READING THE NEWSPAPER OR WATCHING TELEVISION: NOT AT ALL
4. FEELING TIRED OR HAVING LITTLE ENERGY: NEARLY EVERY DAY
2. FEELING DOWN, DEPRESSED OR HOPELESS: MORE THAN HALF THE DAYS
10. IF YOU CHECKED OFF ANY PROBLEMS, HOW DIFFICULT HAVE THESE PROBLEMS MADE IT FOR YOU TO DO YOUR WORK, TAKE CARE OF THINGS AT HOME, OR GET ALONG WITH OTHER PEOPLE: SOMEWHAT DIFFICULT
5. POOR APPETITE OR OVEREATING: NEARLY EVERY DAY

## 2025-05-08 NOTE — PROGRESS NOTES
Henrietta Najera is a 44 y.o. female who was seen by synchronous (real-time) audio-video technology on 5/8/2025.      Assessment & Plan:   Below is the assessment and plan developed based on review of pertinent history, physical exam (if applicable), labs, studies, and medications.    Encounter Diagnosis   Name Primary?    Generalized anxiety disorder Yes     Continue zoloft 100 mg  Add buspar 5 mg BID  Follow-up tomorrow  Continue therapy    I spent at least 23 minutes on this visit with this established patient. ([id number])  Subjective:   Henrietta Najera was seen for Anxiety    Patient reports severe anxiety started 1 week ago. She has no appetite. She is not sleeping. She has had palpitations. EKG was normal at ED 2 days ago. She states she returned from a national swim meet about 10 days ago when this started. No other triggers. She has support from  and family. She is taking zoloft, previously on 25 mg. She increased dose to 50 mg last week and now 100 mg the past 2 days. She started seeing a therapist yesterday with [facility]. Patient denies suicidal thoughts.    Prior to Admission medications    Medication Sig Start Date End Date Taking? Authorizing Provider   busPIRone (BUSPAR) 5 MG tablet Take 1 tablet by mouth 2 times daily 5/8/25 6/7/25 Yes Deon Fairchild APRN - NP   hydrOXYzine HCl (ATARAX) 25 MG tablet Take 1 tablet by mouth every 8 hours as needed for Anxiety 5/6/25 5/16/25 Yes Kelley Peoples PA   sertraline (ZOLOFT) 25 MG tablet TAKE 1 TABLET BY MOUTH EVERY DAY  Patient taking differently: Take 1 tablet by mouth daily Patient reports increasing dosage to 100 mg of the past 2 day. 5/2/25  Yes Munir Zepeda MD       Patient Active Problem List   Diagnosis    Allergic rhinitis, seasonal    Anxiety    Partial rectal prolapse     Patient Active Problem List    Diagnosis Date Noted    Partial rectal prolapse 10/27/2023    Anxiety     Allergic rhinitis, seasonal      Current

## 2025-05-09 ENCOUNTER — TELEMEDICINE (OUTPATIENT)
Age: 45
End: 2025-05-09
Payer: COMMERCIAL

## 2025-05-09 DIAGNOSIS — F41.9 ANXIETY: Primary | ICD-10-CM

## 2025-05-09 PROCEDURE — 99213 OFFICE O/P EST LOW 20 MIN: CPT | Performed by: NURSE PRACTITIONER

## 2025-05-09 RX ORDER — ALPRAZOLAM 0.25 MG
0.25 TABLET ORAL 3 TIMES DAILY PRN
Qty: 5 TABLET | Refills: 0 | Status: SHIPPED | OUTPATIENT
Start: 2025-05-09 | End: 2025-06-08

## 2025-05-10 NOTE — PROGRESS NOTES
Henrietta Najera is a 44 y.o. female who was seen by synchronous (real-time) audio-video technology on 5/9/2025.      Assessment & Plan:   Below is the assessment and plan developed based on review of pertinent history, physical exam (if applicable), labs, studies, and medications.    Encounter Diagnosis   Name Primary?    Anxiety Yes     Continue zoloft 100 mg as prescribed  Continue buspar 5 mg BID  Xanax PRN panic attack  Follow-up next week as scheduled with PCP  Continue therapy weekly    I spent at least 23 minutes on this visit with this established patient. ([id number])  Subjective:   Henrietta Najera was seen for Anxiety    Patient presents for follow-up anxiety. She notes severe anxiety started 1 week ago. She has increased zoloft to 100 mg daily and started buspar 5 mg BID yesterday. She states she does feel a little better today, but still having episodes of panic, tearfulness, and heart racing.    Prior to Admission medications    Medication Sig Start Date End Date Taking? Authorizing Provider   ALPRAZolam (XANAX) 0.25 MG tablet Take 1 tablet by mouth 3 times daily as needed for Anxiety for up to 30 days. Max Daily Amount: 0.75 mg 5/9/25 6/8/25 Yes Deon Fairchild APRN - NP   busPIRone (BUSPAR) 5 MG tablet Take 1 tablet by mouth 2 times daily 5/8/25 6/7/25  Deon Fairchild APRN - NP   hydrOXYzine HCl (ATARAX) 25 MG tablet Take 1 tablet by mouth every 8 hours as needed for Anxiety 5/6/25 5/16/25  Kelley Peoples PA   sertraline (ZOLOFT) 25 MG tablet TAKE 1 TABLET BY MOUTH EVERY DAY  Patient taking differently: Take 1 tablet by mouth daily Patient reports increasing dosage to 100 mg of the past 2 day. 5/2/25   Munir Zepeda MD       Patient Active Problem List   Diagnosis    Allergic rhinitis, seasonal    Anxiety    Partial rectal prolapse     Patient Active Problem List    Diagnosis Date Noted    Partial rectal prolapse 10/27/2023    Anxiety     Allergic rhinitis, seasonal      Current

## 2025-05-12 ENCOUNTER — OFFICE VISIT (OUTPATIENT)
Age: 45
End: 2025-05-12
Payer: COMMERCIAL

## 2025-05-12 VITALS
RESPIRATION RATE: 16 BRPM | BODY MASS INDEX: 19.7 KG/M2 | HEART RATE: 73 BPM | DIASTOLIC BLOOD PRESSURE: 75 MMHG | WEIGHT: 130 LBS | TEMPERATURE: 98.3 F | OXYGEN SATURATION: 100 % | SYSTOLIC BLOOD PRESSURE: 124 MMHG | HEIGHT: 68 IN

## 2025-05-12 DIAGNOSIS — F41.9 ANXIETY: Primary | ICD-10-CM

## 2025-05-12 PROCEDURE — 99214 OFFICE O/P EST MOD 30 MIN: CPT | Performed by: INTERNAL MEDICINE

## 2025-05-12 RX ORDER — BUSPIRONE HYDROCHLORIDE 5 MG/1
10 TABLET ORAL 2 TIMES DAILY
Qty: 4 TABLET | Refills: 0
Start: 2025-05-12 | End: 2025-06-11

## 2025-05-12 RX ORDER — ZOLPIDEM TARTRATE 5 MG/1
5 TABLET ORAL NIGHTLY PRN
Qty: 10 TABLET | Refills: 0 | Status: SHIPPED | OUTPATIENT
Start: 2025-05-12 | End: 2025-05-22

## 2025-05-12 RX ORDER — SERTRALINE HYDROCHLORIDE 25 MG/1
50 TABLET, FILM COATED ORAL DAILY
Qty: 2 TABLET | Refills: 0
Start: 2025-05-12

## 2025-05-12 SDOH — ECONOMIC STABILITY: FOOD INSECURITY: WITHIN THE PAST 12 MONTHS, YOU WORRIED THAT YOUR FOOD WOULD RUN OUT BEFORE YOU GOT MONEY TO BUY MORE.: NEVER TRUE

## 2025-05-12 SDOH — ECONOMIC STABILITY: FOOD INSECURITY: WITHIN THE PAST 12 MONTHS, THE FOOD YOU BOUGHT JUST DIDN'T LAST AND YOU DIDN'T HAVE MONEY TO GET MORE.: NEVER TRUE

## 2025-05-12 NOTE — PROGRESS NOTES
Henrietta Najera is a 44 y.o. female who was seen in clinic today (5/12/2025).      Assessment & Plan:   Below is the assessment and plan developed based on review of pertinent history, physical exam, labs, studies, and medications.  Assessment & Plan  1. Anxiety.  Chronic medical issue that is acutely worse and without obvious etiology. At this time nothing to suggest anything other then anxiety, ie pulmonary embolism or another serious conditions. ER work up reviewed with her. Sertraline increased from 25mg to 100mg in the last week, will reduce to 50mg.  This was the dose she was on in the past and reviewed it will take another week to see any improvement in mood from dose increase. Increase BuSpar to 10 mg twice daily, with option to increase every 3 days. Prescribed Ambien 10 mg, 30 minutes before bedtime. We discussed she might just need a reset on her sleep cycle and can use this for 2-3 days and then reassess. Expectations and side effects reviewed. For further panic attacks she can try 2 Xanax tablets during severe panic attacks. Continue counseling sessions and utilize EAP for support. Will have her follow up on Friday.       Diagnoses/Orders:   1. Anxiety  -     sertraline (ZOLOFT) 25 MG tablet; Take 2 tablets by mouth daily, Disp-2 tablet, R-0NO PRINT  -     busPIRone (BUSPAR) 5 MG tablet; Take 2 tablets by mouth 2 times daily, Disp-4 tablet, R-0NO PRINT  -     zolpidem (AMBIEN) 5 MG tablet; Take 1 tablet by mouth nightly as needed for Sleep for up to 10 days. Max Daily Amount: 5 mg, Disp-10 tablet, R-0Normal         Return in 4 days (on 5/16/2025) for follow up on mood.   Subjective/Objective:   Henrietta was seen today for Other (Pt here for hosp f/u *[place] last week)     History of Present Illness  The patient presents for ER follow up.    She experienced heightened anxiety on 05/06/2025, necessitating an ER visit. Symptoms began after a strenuous 3-day swim meet in [place]. Despite performing

## 2025-05-16 ENCOUNTER — OFFICE VISIT (OUTPATIENT)
Age: 45
End: 2025-05-16
Payer: COMMERCIAL

## 2025-05-16 VITALS
SYSTOLIC BLOOD PRESSURE: 132 MMHG | RESPIRATION RATE: 16 BRPM | TEMPERATURE: 98.9 F | DIASTOLIC BLOOD PRESSURE: 73 MMHG | HEIGHT: 68 IN | BODY MASS INDEX: 19.88 KG/M2 | HEART RATE: 81 BPM | WEIGHT: 131.2 LBS | OXYGEN SATURATION: 99 %

## 2025-05-16 DIAGNOSIS — F41.9 ANXIETY: Primary | ICD-10-CM

## 2025-05-16 DIAGNOSIS — F32.9 REACTIVE DEPRESSION: ICD-10-CM

## 2025-05-16 PROCEDURE — 99214 OFFICE O/P EST MOD 30 MIN: CPT | Performed by: INTERNAL MEDICINE

## 2025-05-16 RX ORDER — LORAZEPAM 0.5 MG/1
0.5 TABLET ORAL EVERY 8 HOURS PRN
Qty: 6 TABLET | Refills: 0 | Status: SHIPPED | OUTPATIENT
Start: 2025-05-16 | End: 2025-06-15

## 2025-05-16 RX ORDER — HYDROXYZINE HYDROCHLORIDE 25 MG/1
25 TABLET, FILM COATED ORAL 3 TIMES DAILY PRN
COMMUNITY

## 2025-05-16 ASSESSMENT — PATIENT HEALTH QUESTIONNAIRE - PHQ9
SUM OF ALL RESPONSES TO PHQ QUESTIONS 1-9: 20
7. TROUBLE CONCENTRATING ON THINGS, SUCH AS READING THE NEWSPAPER OR WATCHING TELEVISION: SEVERAL DAYS
4. FEELING TIRED OR HAVING LITTLE ENERGY: NEARLY EVERY DAY
5. POOR APPETITE OR OVEREATING: NEARLY EVERY DAY
SUM OF ALL RESPONSES TO PHQ QUESTIONS 1-9: 19
2. FEELING DOWN, DEPRESSED OR HOPELESS: NEARLY EVERY DAY
SUM OF ALL RESPONSES TO PHQ QUESTIONS 1-9: 20
10. IF YOU CHECKED OFF ANY PROBLEMS, HOW DIFFICULT HAVE THESE PROBLEMS MADE IT FOR YOU TO DO YOUR WORK, TAKE CARE OF THINGS AT HOME, OR GET ALONG WITH OTHER PEOPLE: VERY DIFFICULT
6. FEELING BAD ABOUT YOURSELF - OR THAT YOU ARE A FAILURE OR HAVE LET YOURSELF OR YOUR FAMILY DOWN: NEARLY EVERY DAY
9. THOUGHTS THAT YOU WOULD BE BETTER OFF DEAD, OR OF HURTING YOURSELF: SEVERAL DAYS
3. TROUBLE FALLING OR STAYING ASLEEP: NEARLY EVERY DAY
1. LITTLE INTEREST OR PLEASURE IN DOING THINGS: NEARLY EVERY DAY
8. MOVING OR SPEAKING SO SLOWLY THAT OTHER PEOPLE COULD HAVE NOTICED. OR THE OPPOSITE, BEING SO FIGETY OR RESTLESS THAT YOU HAVE BEEN MOVING AROUND A LOT MORE THAN USUAL: NOT AT ALL
SUM OF ALL RESPONSES TO PHQ QUESTIONS 1-9: 20

## 2025-05-16 ASSESSMENT — COLUMBIA-SUICIDE SEVERITY RATING SCALE - C-SSRS
2. HAVE YOU ACTUALLY HAD ANY THOUGHTS OF KILLING YOURSELF?: YES
5. HAVE YOU STARTED TO WORK OUT OR WORKED OUT THE DETAILS OF HOW TO KILL YOURSELF? DO YOU INTEND TO CARRY OUT THIS PLAN?: NO
1. WITHIN THE PAST MONTH, HAVE YOU WISHED YOU WERE DEAD OR WISHED YOU COULD GO TO SLEEP AND NOT WAKE UP?: YES
6. HAVE YOU EVER DONE ANYTHING, STARTED TO DO ANYTHING, OR PREPARED TO DO ANYTHING TO END YOUR LIFE?: NO
3. HAVE YOU BEEN THINKING ABOUT HOW YOU MIGHT KILL YOURSELF?: NO
4. HAVE YOU HAD THESE THOUGHTS AND HAD SOME INTENTION OF ACTING ON THEM?: NO

## 2025-05-16 NOTE — PROGRESS NOTES
Weight: 59.5 kg (131 lb 3.2 oz)   Height: 1.727 m (5' 7.99\")      Body mass index is 19.95 kg/m².     Prior to Visit Medications    Medication Sig Taking? Authorizing Provider   hydrOXYzine HCl (ATARAX) 25 MG tablet Take 1 tablet by mouth 3 times daily as needed for Itching Yes Provider, MD Zhen   sertraline (ZOLOFT) 25 MG tablet Take 2 tablets by mouth daily  Patient taking differently: Take 4 tablets by mouth daily Yes Munir Zepeda MD   busPIRone (BUSPAR) 5 MG tablet Take 2 tablets by mouth 2 times daily  Patient taking differently: Take 3 tablets by mouth 2 times daily Yes Munir Zepeda MD   zolpidem (AMBIEN) 5 MG tablet Take 1 tablet by mouth nightly as needed for Sleep for up to 10 days. Max Daily Amount: 5 mg  Munir Zepeda MD   ALPRAZolam (XANAX) 0.25 MG tablet Take 1 tablet by mouth 3 times daily as needed for Anxiety for up to 30 days. Max Daily Amount: 0.75 mg  Patient not taking: Reported on 5/16/2025  Deon Fairchild, APRN - NP         The patient (or guardian, if applicable) and other individuals in attendance with the patient were advised that Artificial Intelligence will be utilized during this visit to record, process the conversation to generate a clinical note, and support improvement of the AI technology. The patient (or guardian, if applicable) and other individuals in attendance at the appointment consented to the use of AI, including the recording.      Munir Zepeda MD

## 2025-05-19 ENCOUNTER — TELEPHONE (OUTPATIENT)
Age: 45
End: 2025-05-19

## 2025-05-19 NOTE — TELEPHONE ENCOUNTER
Nurse did talk to a family member. She is at [facility] for evaluation, hopeful admission.   Agree with plan and will follow up after discharge.

## 2025-05-19 NOTE — TELEPHONE ENCOUNTER
See other telephone call:    Nurse did talk to a family member. She is at [facility] for evaluation, hopeful admission.   Agree with plan and will follow up after discharge.

## 2025-05-19 NOTE — TELEPHONE ENCOUNTER
Reason for call:  Spoke with pt. Pt requested a call from nurse in regards mental health question.    Is this a new problem: Yes    Date of last appointment:  5/16/2025     Can we respond via [employer]: No    Best call back number: Henrietta Najera   [phone]

## 2025-05-19 NOTE — TELEPHONE ENCOUNTER
Upon Dr Zepeda advise, call made to  and call went to  as well.  Did leave message for return call.

## (undated) DEVICE — ROCKER SWITCH PENCIL BLADE ELECTRODE, HOLSTER: Brand: EDGE

## (undated) DEVICE — CURITY NON-ADHERENT STRIPS: Brand: CURITY

## (undated) DEVICE — KENDALL SCD EXPRESS SLEEVES, KNEE LENGTH, MEDIUM: Brand: KENDALL SCD

## (undated) DEVICE — Device

## (undated) DEVICE — GOWN,PREVENTION PLUS,XLN/XL,ST,24/CS: Brand: MEDLINE

## (undated) DEVICE — SUTURE VCRL SZ 2-0 L27IN ABSRB UD L26MM SH 1/2 CIR J417H

## (undated) DEVICE — SURGICAL PROCEDURE PACK BASIN MAJ SET CUST NO CAUT

## (undated) DEVICE — HANDLE LT SNAP ON ULT DURABLE LENS FOR TRUMPF ALC DISPOSABLE

## (undated) DEVICE — TOWEL SURG W17XL27IN STD BLU COT NONFENESTRATED PREWASHED

## (undated) DEVICE — SOLUTION IV 1000ML 0.9% SOD CHL

## (undated) DEVICE — GAUZE SPONGES,12 PLY: Brand: CURITY

## (undated) DEVICE — 3M™ TEGADERM™ TRANSPARENT FILM DRESSING FRAME STYLE, 1628, 6 IN X 8 IN (15 CM X 20 CM), 10/CT 8CT/CASE: Brand: 3M™ TEGADERM™

## (undated) DEVICE — REM POLYHESIVE ADULT PATIENT RETURN ELECTRODE: Brand: VALLEYLAB

## (undated) DEVICE — PREP SKN PREVAIL 40ML APPL --

## (undated) DEVICE — ARGYLE FRAZIER SURGICAL SUCTION INSTRUMENT 10 FR/CH (3.3 MM): Brand: ARGYLE

## (undated) DEVICE — DRAPE,LAPAROTOMY,T,PEDI,STERILE: Brand: MEDLINE

## (undated) DEVICE — DRAPE,REIN 53X77,STERILE: Brand: MEDLINE

## (undated) DEVICE — 1200 GUARD II KIT W/5MM TUBE W/O VAC TUBE: Brand: GUARDIAN

## (undated) DEVICE — 3M™ IOBAN™ 2 ANTIMICROBIAL INCISE DRAPE 6640EZ: Brand: IOBAN™ 2

## (undated) DEVICE — INTENDED FOR TISSUE SEPARATION, AND OTHER PROCEDURES THAT REQUIRE A SHARP SURGICAL BLADE TO PUNCTURE OR CUT.: Brand: BARD-PARKER ® CARBON RIB-BACK BLADES

## (undated) DEVICE — BANDAGE COMPR SELF ADH 5 YDX4 IN TAN STRL PREMIERPRO LF

## (undated) DEVICE — INFECTION CONTROL KIT SYS

## (undated) DEVICE — 2.8MM QUICK RELEASE DRILL: Brand: ACUMED

## (undated) DEVICE — Z DISCONTINUED USE 2275686 GLOVE SURG SZ 8 L12IN FNGR THK13MIL WHT ISOLEX POLYISOPRENE

## (undated) DEVICE — DRAPE,U/ SHT,SPLIT,PLAS,STERIL: Brand: MEDLINE

## (undated) DEVICE — CONVERTORS STOCKINETTE: Brand: CONVERTORS

## (undated) DEVICE — (D)STRIP SKN CLSR 0.5X4IN WHT --